# Patient Record
Sex: MALE | Race: WHITE | NOT HISPANIC OR LATINO | Employment: OTHER | URBAN - METROPOLITAN AREA
[De-identification: names, ages, dates, MRNs, and addresses within clinical notes are randomized per-mention and may not be internally consistent; named-entity substitution may affect disease eponyms.]

---

## 2017-03-23 ENCOUNTER — OFFICE VISIT (OUTPATIENT)
Dept: URGENT CARE | Facility: CLINIC | Age: 47
End: 2017-03-23
Payer: MEDICARE

## 2017-03-23 VITALS
DIASTOLIC BLOOD PRESSURE: 100 MMHG | HEART RATE: 90 BPM | WEIGHT: 172 LBS | TEMPERATURE: 98.8 F | BODY MASS INDEX: 27 KG/M2 | HEIGHT: 67 IN | SYSTOLIC BLOOD PRESSURE: 140 MMHG | RESPIRATION RATE: 18 BRPM | OXYGEN SATURATION: 97 %

## 2017-03-23 DIAGNOSIS — R10.84 GENERALIZED ABDOMINAL PAIN: ICD-10-CM

## 2017-03-23 DIAGNOSIS — H92.02 OTALGIA, LEFT EAR: ICD-10-CM

## 2017-03-23 DIAGNOSIS — K12.2 ORAL INFECTION: ICD-10-CM

## 2017-03-23 DIAGNOSIS — R11.2 INTRACTABLE VOMITING WITH NAUSEA, UNSPECIFIED VOMITING TYPE: ICD-10-CM

## 2017-03-23 DIAGNOSIS — R19.5 DARK STOOLS: ICD-10-CM

## 2017-03-23 DIAGNOSIS — K08.89 PAIN, DENTAL: ICD-10-CM

## 2017-03-23 PROCEDURE — G8484 FLU IMMUNIZE NO ADMIN: HCPCS | Performed by: NURSE PRACTITIONER

## 2017-03-23 PROCEDURE — 99214 OFFICE O/P EST MOD 30 MIN: CPT | Performed by: NURSE PRACTITIONER

## 2017-03-23 PROCEDURE — G8432 DEP SCR NOT DOC, RNG: HCPCS | Performed by: NURSE PRACTITIONER

## 2017-03-23 PROCEDURE — G8419 CALC BMI OUT NRM PARAM NOF/U: HCPCS | Performed by: NURSE PRACTITIONER

## 2017-03-23 PROCEDURE — 4004F PT TOBACCO SCREEN RCVD TLK: CPT | Performed by: NURSE PRACTITIONER

## 2017-03-23 RX ORDER — ACETAMINOPHEN 500 MG
500 TABLET ORAL EVERY 6 HOURS PRN
Qty: 15 TAB | Refills: 0 | Status: SHIPPED | OUTPATIENT
Start: 2017-03-23 | End: 2017-03-27

## 2017-03-23 RX ORDER — AMOXICILLIN AND CLAVULANATE POTASSIUM 875; 125 MG/1; MG/1
1 TABLET, FILM COATED ORAL 2 TIMES DAILY
Qty: 14 TAB | Refills: 0 | Status: SHIPPED | OUTPATIENT
Start: 2017-03-23 | End: 2017-03-30

## 2017-03-23 NOTE — MR AVS SNAPSHOT
"        Zev Mckinney   3/23/2017 4:45 PM   Office Visit   MRN: [id number]    Department:  [facility]   Dept Phone:  [phone]    Description:  Male : 1970   Provider:  CLIVE Barlow           Reason for Visit     Oral Swelling x1 month/bottom left of mouth/ left ear pain    GI Problem x 1 month or so/ burning in abdomen/vomiting/feels hot      Allergies as of 3/23/2017     Allergen Noted Reactions    Nkda [No Known Drug Allergy] 2008         You were diagnosed with     Generalized abdominal pain   [695144]       Intractable vomiting with nausea, unspecified vomiting type   [3858340]       Dark stools   [250413]       Otalgia, left ear   [3978385]       Pain, dental   [040482]       Oral infection   [743127]         Vital Signs     Blood Pressure Pulse Temperature Respirations Height Weight    140/100 mmHg 90 37.1 °C (98.8 °F) 18 1.702 m (5' 7\") 78.019 kg (172 lb)    Body Mass Index Oxygen Saturation Smoking Status             26.93 kg/m2 97% Current Every Day Smoker         Basic Information     Date Of Birth Sex Race Ethnicity Preferred Language    1970 Male White Non- English      Problem List              ICD-10-CM Priority Class Noted - Resolved    Cellulitis L03.90 High  8/15/2015 - Present    MRSA (methicillin resistant staph aureus) culture positive Z22.322 High  8/15/2015 - Present    Petechial rash R23.3 Medium  2015 - Present    Tobacco abuse Z72.0 Low  2015 - Present    Diarrhea R19.7 Medium  2015 - Present    Marijuana abuse F12.10 Low  2015 - Present    Methamphetamine abuse F15.10 Low  2015 - Present      Health Maintenance        Date Due Completion Dates    IMM PNEUMOCOCCAL 19-64 (ADULT) MEDIUM RISK SERIES (1 of 1 - PPSV23) 1991 ---    IMM DTaP/Tdap/Td Vaccine (1 - Tdap) 2010, 2010, 2009    IMM INFLUENZA (1) 2016 ---            Current Immunizations     TD Vaccine 2010 12:15 PM, 2010 " 12:15 AM, 11/21/2009 12:45 PM      Below and/or attached are the medications your provider expects you to take. Review all of your home medications and newly ordered medications with your provider and/or pharmacist. Follow medication instructions as directed by your provider and/or pharmacist. Please keep your medication list with you and share with your provider. Update the information when medications are discontinued, doses are changed, or new medications (including over-the-counter products) are added; and carry medication information at all times in the event of emergency situations     Allergies:  NKDA - (reactions not documented)               Medications  Valid as of: March 23, 2017 -  5:32 PM    Generic Name Brand Name Tablet Size Instructions for use    Acetaminophen (Tab) TYLENOL 500 MG Take 1 Tab by mouth every 6 hours as needed for Moderate Pain for up to 4 days.        Amoxicillin-Pot Clavulanate (Tab) AUGMENTIN 875-125 MG Take 1 Tab by mouth 2 times a day for 7 days.        Mupirocin (Ointment) BACTROBAN 2 % Apply 1 Application to affected area(s) 2 times a day.        OXcarbazepine (Tab) TRILEPTAL 300 MG Take 300 mg by mouth 2 times a day.        Sulfamethoxazole-Trimethoprim (Tab) BACTRIM -160 MG Take 1 Tab by mouth 2 times a day.        Topiramate (Tab) TOPAMAX 25 MG Take 25 mg by mouth 2 times a day.        TraZODone HCl (Tab) DESYREL 100 MG Take 100 mg by mouth every evening.        Ziprasidone HCl (Cap) GEODON 60 MG Take 120 mg by mouth every bedtime.        .                 Medicines prescribed today were sent to:     [facility] [address] [place] [address], [place] - [address] [place] [address] AT [facility] & [address] [place] [address]    Phone: [phone] Fax: [phone]    Open 24 Hours?: No      Medication refill instructions:       If your prescription bottle indicates you have medication refills left, it is not necessary to call your provider’s office. Please contact your  pharmacy and they will refill your medication.    If your prescription bottle indicates you do not have any refills left, you may request refills at any time through one of the following ways: The online [employer] system (except Urgent Care), by calling your provider’s office, or by asking your pharmacy to contact your provider’s office with a refill request. Medication refills are processed only during regular business hours and may not be available until the next business day. Your provider may request additional information or to have a follow-up visit with you prior to refilling your medication.   *Please Note: Medication refills are assigned a new Rx number when refilled electronically. Your pharmacy may indicate that no refills were authorized even though a new prescription for the same medication is available at the pharmacy. Please request the medicine by name with the pharmacy before contacting your provider for a refill.        Referral     A referral request has been sent to our patient care coordination department. Please allow 3-5 business days for us to process this request and contact you either by phone or mail. If you do not hear from us by the 5th business day, please call us at [phone].           [facility] Status: Patient Declined        Quit Tobacco Information     Do you want to quit using tobacco?    Quitting tobacco decreases risks of cancer, heart and lung disease, increases life expectancy, improves sense of taste and smell, and increases spending money, among other benefits.    If you are thinking about quitting, we can help.  • [facility] Quit Tobacco Program: 139.414.4324  o Program occurs weekly for four weeks and includes pharmacist consultation on products to support quitting smoking or chewing tobacco. A provider referral is needed for pharmacist consultation.  • Tobacco Users Help Hotline: 4-079-QUIT-NOW (423-3612) or https://[place].quitlogix.org/  o Free, confidential telephone and  online coaching for [place] residents. Sessions are designed on a schedule that is convenient for you. Eligible clients receive free nicotine replacement therapy.  • Nationally: www.smokefree.gov  o Information and professional assistance to support both immediate and long-term needs as you become, and remain, a non-smoker. Smokefree.gov allows you to choose the help that best fits your needs.

## 2017-03-25 ASSESSMENT — ENCOUNTER SYMPTOMS
ROS GI COMMENTS: 1
ABDOMINAL PAIN: 1
HEADACHES: 0
DIARRHEA: 0
DIAPHORESIS: 0
VOMITING: 1
WEAKNESS: 0
SHORTNESS OF BREATH: 0
NAUSEA: 1
CHILLS: 0
CONSTIPATION: 0
COUGH: 0
SORE THROAT: 0
HEARTBURN: 0
FEVER: 0
FLANK PAIN: 0

## 2017-03-25 NOTE — PROGRESS NOTES
"Subjective:      Zev Mckinney is a 46 y.o. male who presents with Oral Swelling and GI Problem            GI Problem  Associated symptoms include abdominal pain, nausea and vomiting. Pertinent negatives include no chest pain, chills, congestion, coughing, diaphoresis, fever, headaches, rash, sore throat or weakness.   Patient comes in today with a 1 month history of oral pain in the lower left molar region with pain radiating to his left ear.  He reports \"bad teeth\" but is not established with a dentist due to cost.  He denies any fever or chills.  He has not taken any medication to treat the symptoms.    As a second concern, he has a month long history of generalized abdominal pain with daily nausea and intermittent vomiting.  He vomits usually once a day.  He also notes some occasional dark stools.  He denies any heartburn.  He denies any constipation or diarrhea.  He has not tried any home measures to treat the symptoms.  He is established with [facility].      Review of Systems   Constitutional: Negative for fever, chills, malaise/fatigue and diaphoresis.   HENT: Positive for ear pain. Negative for congestion and sore throat.    Respiratory: Negative for cough and shortness of breath.    Cardiovascular: Negative for chest pain.   Gastrointestinal: Positive for nausea, vomiting and abdominal pain. Negative for heartburn, diarrhea and constipation.        1   Genitourinary: Negative for dysuria, urgency, frequency, hematuria and flank pain.   Skin: Negative for rash.   Neurological: Negative for weakness and headaches.     Medications, Allergies, and current problem list reviewed today in Epic     Objective:     /100 mmHg  Pulse 90  Temp(Src) 37.1 °C (98.8 °F)  Resp 18  Ht 1.702 m (5' 7\")  Wt 78.019 kg (172 lb)  BMI 26.93 kg/m2  SpO2 97%     Physical Exam   Constitutional: He is oriented to person, place, and time. He appears well-developed and well-nourished. No distress.   HENT:   Head: " Normocephalic.   Right Ear: External ear normal.   Left Ear: External ear normal.   Nose: Nose normal.   Mouth/Throat: No oropharyngeal exudate.   General poor dentition with multiple broken and carious teeth.  No gum swelling, erythema, or fluctuance.  Generalized pain on palpation of the left lower molar region and surrounding gums.  NO purulence or bleeding noted.  Loose molars noted.     Eyes: Conjunctivae are normal. Pupils are equal, round, and reactive to light. Right eye exhibits no discharge. Left eye exhibits no discharge. No scleral icterus.   Neck: Neck supple. No JVD present. No tracheal deviation present. No thyromegaly present.   Cardiovascular: Normal rate, regular rhythm and normal heart sounds.  Exam reveals no gallop and no friction rub.    No murmur heard.  Pulmonary/Chest: Effort normal and breath sounds normal. No stridor. No respiratory distress. He has no wheezes. He has no rales. He exhibits no tenderness.   Abdominal: Soft. Normal appearance and bowel sounds are normal. He exhibits no shifting dullness, no distension, no pulsatile liver, no fluid wave, no ascites, no pulsatile midline mass and no mass. There is no hepatosplenomegaly. There is generalized tenderness. There is no rigidity, no rebound, no guarding, no CVA tenderness, no tenderness at McBurney's point and negative See's sign. No hernia.   Genitourinary: Rectum normal. Rectal exam shows no external hemorrhoid, no internal hemorrhoid, no fissure, no mass, no tenderness and anal tone normal. Guaiac negative stool. Prostate is not enlarged and not tender.   Musculoskeletal: He exhibits no edema.   Lymphadenopathy:     He has no cervical adenopathy.   Neurological: He is alert and oriented to person, place, and time.   Skin: Skin is warm and dry. He is not diaphoretic. No erythema. No pallor.   Vitals reviewed.              Assessment/Plan:     1. Generalized abdominal pain  REFERRAL TO GASTROENTEROLOGY   2. Intractable vomiting  with nausea, unspecified vomiting type  REFERRAL TO GASTROENTEROLOGY   3. Dark stools  REFERRAL TO GASTROENTEROLOGY   4. Otalgia, left ear     5. Pain, dental  acetaminophen (TYLENOL) 500 MG Tab   6. Oral infection  amoxicillin-clavulanate (AUGMENTIN) 875-125 MG Tab     Discussed exam findings with patient.  Take full course of antibiotics for presumed oral infection.  Tylenol as prescribed prn mouth pain.   Follow up with dentist as soon as possible for further assessment and care.   Follow up with GI as referred.  Symptom etiology unknown.  ED precautions given for any worsening symptoms.    Maintain adequate po hydration.  Patient verbalized understanding of and agreed with plan of care.

## 2017-05-27 ENCOUNTER — HOSPITAL ENCOUNTER (EMERGENCY)
Facility: MEDICAL CENTER | Age: 47
End: 2017-05-27
Attending: EMERGENCY MEDICINE
Payer: MEDICARE

## 2017-05-27 VITALS
TEMPERATURE: 98.3 F | WEIGHT: 174 LBS | HEIGHT: 68 IN | BODY MASS INDEX: 26.37 KG/M2 | RESPIRATION RATE: 16 BRPM | OXYGEN SATURATION: 98 % | DIASTOLIC BLOOD PRESSURE: 93 MMHG | SYSTOLIC BLOOD PRESSURE: 132 MMHG | HEART RATE: 71 BPM

## 2017-05-27 DIAGNOSIS — N48.33 PRIAPISM, DRUG-INDUCED: ICD-10-CM

## 2017-05-27 PROCEDURE — 54220 IRRG CRPRA CAVRNOSA PRIAPISM: CPT

## 2017-05-27 PROCEDURE — 700101 HCHG RX REV CODE 250: Performed by: EMERGENCY MEDICINE

## 2017-05-27 PROCEDURE — 700111 HCHG RX REV CODE 636 W/ 250 OVERRIDE (IP): Performed by: EMERGENCY MEDICINE

## 2017-05-27 PROCEDURE — 700105 HCHG RX REV CODE 258: Performed by: EMERGENCY MEDICINE

## 2017-05-27 PROCEDURE — 306637 HCHG MISC ORTHO ITEM RC 0274

## 2017-05-27 PROCEDURE — 99284 EMERGENCY DEPT VISIT MOD MDM: CPT

## 2017-05-27 RX ORDER — LORAZEPAM 0.5 MG/1
0.5 TABLET ORAL EVERY 4 HOURS PRN
Qty: 30 TAB | Refills: 0 | Status: SHIPPED | OUTPATIENT
Start: 2017-05-27 | End: 2023-07-11

## 2017-05-27 RX ADMIN — PHENYLEPHRINE HYDROCHLORIDE: 10 INJECTION INTRAVENOUS at 19:25

## 2017-05-27 RX ADMIN — LIDOCAINE HYDROCHLORIDE 8 ML: 20 INJECTION, SOLUTION INFILTRATION; PERINEURAL at 19:25

## 2017-05-27 ASSESSMENT — LIFESTYLE VARIABLES: DO YOU DRINK ALCOHOL: NO

## 2017-05-27 ASSESSMENT — PAIN SCALES - GENERAL: PAINLEVEL_OUTOF10: 8

## 2017-05-27 NOTE — ED AVS SNAPSHOT
Home Care Instructions                                                                                                                Zev Mckinney   MRN: [id number]    Department:  [facility], Emergency Dept   Date of Visit:  5/27/2017            [facility] Emergency [facility]    [address]    [place] [address]    Phone:  [phone]      You were seen by     Zev Whitman M.D.      Your Diagnosis Was     Priapism, drug-induced     N48.33       These are the medications you received during your hospitalization from 05/27/2017 1843 to 05/27/2017 1954     Date/Time Order Dose Route Action    05/27/2017 1925 phenylephrine (WOODY-SYNEPHRINE) 10 mg/20 mL inj syringe   Intracavernosal Bolus    05/27/2017 1925 lidocaine buffered 1.8% injection 8 mL 8 mL Injection Given      Follow-up Information     1. Follow up with Luis White M.D..    Specialty:  Urology    Why:  As needed, If symptoms worsen    Contact information    [address]  [facility] [id number] [phone]        Medication Information     Review all of your home medications and newly ordered medications with your primary doctor and/or pharmacist as soon as possible. Follow medication instructions as directed by your doctor and/or pharmacist.     Please keep your complete medication list with you and share with your physician. Update the information when medications are discontinued, doses are changed, or new medications (including over-the-counter products) are added; and carry medication information at all times in the event of emergency situations.               Medication List      ASK your doctor about these medications        Instructions    Morning Afternoon Evening Bedtime    mupirocin 2 % Oint   Commonly known as:  BACTROBAN        Apply 1 Application to affected area(s) 2 times a day.   Dose:  1 Application                        oxcarbazepine 300 MG Tabs   Commonly known as:  TRILEPTAL       Take 300 mg by mouth 2 times a day.   Dose:  300 mg                        sulfamethoxazole-trimethoprim 800-160 MG tablet   Commonly known as:  BACTRIM DS        Take 1 Tab by mouth 2 times a day.   Dose:  1 Tab                        topiramate 25 MG Tabs   Commonly known as:  TOPAMAX        Take 25 mg by mouth 2 times a day.   Dose:  25 mg                        trazodone 100 MG Tabs   Commonly known as:  DESYREL        Take 100 mg by mouth every evening.   Dose:  100 mg                        ziprasidone 60 MG Caps   Commonly known as:  GEODON        Take 120 mg by mouth every bedtime.   Dose:  120 mg                                  Discharge Instructions       Priapism  Stop the drug trazodone this gave you this condition  Priapism is a persistent, often painful erection. It is the hardening of the penis in males and of the clitoris in females, even without sexual stimulation. Priapism may come on suddenly. Priapism may last a short while, or may last a long time. Priapism occurs in all ages. The types of priapism include:  · Acute prolonged priapism--Priapism that comes on suddenly and lasts.  · Recurrent acute priapism--Priapism that comes on suddenly and tends to happen again.  · Chronic priapism--Priapism that is persistent but with less of an erection.  CAUSES   There are many causes. Causes include:  · Blood problems common in people with the following diseases:  ¨ Sickle cell disease.  ¨ Leukemia.  · Side effects of erectile dysfunction medicine. This is the most common cause of priapism.  · Side effects of prescription medicine used in the treatment of depression and anxiety.  · Illegal use of street drugs such as cocaine and marijuana.  · Excessive use of alcohol.  · Neurological problems such as multiple sclerosis.  · Diabetes mellitus.  · The cause may be unknown.  SIGNS AND SYMPTOMS  · A prolonged erection, usually without sexual stimulation or following the use of erectile dysfunction  medicine.  · A painful erection.  DIAGNOSIS  Diagnosis of priapism can usually be confirmed by your health care provider after a physical exam. Your health care provider may have blood tests done to search for a potential cause, such as leukemia or sickle cell disease.  TREATMENT   Treatments depend on the cause. Some specific treatments include:  · Oxygen and red blood cell transfusions in patients with sickle cell disease.  · A special treatment for plasma in those with leukemia.  · Removing blood that is trapped.  · Treatment with medicine.  · Surgical shunting (a passage that is made to allow blood to flow from one part of the body to another).  HOME CARE INFORMATION  · Avoid sexual stimulation and intercourse until your health care provider says it is okay.  · Avoid the use of alcohol or drugs to minimize recurrence of priapism.  SEEK MEDICAL CARE IF:  · You experience worsening pain instead of improvement.  SEEK IMMEDIATE MEDICAL CARE IF:  · You experience fever or shaking chills.  · You experience pain, swelling, or redness in your genital or groin area.  MAKE SURE YOU:  · Understand these instructions.    · Will watch your condition.  · Will get help right away if you are not doing well or get worse.     This information is not intended to replace advice given to you by your health care provider. Make sure you discuss any questions you have with your health care provider.     Document Released: 03/09/2005 Document Revised: 10/08/2014 Document Reviewed: 03/22/2016  Elsevier Interactive Patient Education ©2016 Elsevier Inc.            Patient Information     Patient Information    Following emergency treatment: all patient requiring follow-up care must return either to a private physician or a clinic if your condition worsens before you are able to obtain further medical attention, please return to the emergency room.     Billing Information    At [facility], we work to make the billing process streamlined  for our patients.  Our Representatives are here to answer any questions you may have regarding your hospital bill.  If you have insurance coverage and have supplied your insurance information to us, we will submit a claim to your insurer on your behalf.  Should you have any questions regarding your bill, we can be reached online or by phone as follows:  Online: You are able pay your bills online or live chat with our representatives about any billing questions you may have. We are here to help Monday - Friday from 8:00am to 7:30pm and 9:00am - 12:00pm on Saturdays.  Please visit [email or web address]  for more information.   Phone:  [phone] or [phone]    Please note that your emergency physician, surgeon, pathologist, radiologist, anesthesiologist, and other specialists are not employed by [facility] and will therefore bill separately for their services.  Please contact them directly for any questions concerning their bills at the numbers below:     Emergency Physician Services:  [phone]  [facility]:  [phone]  [facility]:  [phone]  [facility]:  [phone]    1. Your final bill may vary from the amount quoted upon discharge if all procedures are not complete at that time, or if your doctor has additional procedures of which we are not aware. You will receive an additional bill if you return to the Emergency Department at [facility] for suture removal regardless of the facility of which the sutures were placed.     2. Please arrange for settlement of this account at the emergency registration.    3. All self-pay accounts are due in full at the time of treatment.  If you are unable to meet this obligation then payment is expected within 4-5 days.     4. If you have had radiology studies (CT, X-ray, Ultrasound, MRI), you have received a preliminary result during your emergency department visit. Please contact  the radiology department [phone] to receive a copy of your final result. Please discuss the Final result with your primary physician or with the follow up physician provided.     Crisis Hotline:  [place] Crisis Hotline:  0-712-NJYXRZU or 1-788.632.5998  [place] Crisis Hotline:    1-613.536.3344 or 717-072-4385         ED Discharge Follow Up Questions    1. In order to provide you with very good care, we would like to follow up with a phone call in the next few days.  May we have your permission to contact you?     YES /  NO    2. What is the best phone number to call you? (       )_____-__________    3. What is the best time to call you?      Morning  /  Afternoon  /  Evening                   Patient Signature:  ____________________________________________________________    Date:  ____________________________________________________________

## 2017-05-27 NOTE — ED AVS SNAPSHOT
[employer] Access Code: Activation code not generated  Current [employer] Status: Patient Declined    Your email address is not on file at [facility].  Email Addresses are required for you to sign up for [employer], please contact [phone] to verify your personal information and to provide your email address prior to attempting to register for [employer].    Zev Mckinney  [address]  [place] [address]    [employer]  A secure, online tool to manage your health information     [facility]’s [employer]® is a secure, online tool that connects you to your personalized health information from the privacy of your home -- day or night - making it very easy for you to manage your healthcare. Once the activation process is completed, you can even access your medical information using the [employer] thania, which is available for free in the Apple Thania store or Google Play store.     To learn more about [employer], visit [email or web address]    There are two levels of access available (as shown below):   My Chart Features  [facility] Primary Care Doctor [facility]  Specialists [facility]  Urgent  Care Non-[facility] Primary Care Doctor   Email your healthcare team securely and privately 24/7 X X X    Manage appointments: schedule your next appointment; view details of past/upcoming appointments X      Request prescription refills. X      View recent personal medical records, including lab and immunizations X X X X   View health record, including health history, allergies, medications X X X X   Read reports about your outpatient visits, procedures, consult and ER notes X X X X   See your discharge summary, which is a recap of your hospital and/or ER visit that includes your diagnosis, lab results, and care plan X X  X     How to register for [employer]:  Once your e-mail address has been verified, follow the following steps to sign up for [employer].     1. Go to  [email or web address]  2. Click on the Sign Up Now box, which takes you to the New  Member Sign Up page. You will need to provide the following information:  a. Enter your [employer] Access Code exactly as it appears at the top of this page. (You will not need to use this code after you’ve completed the sign-up process. If you do not sign up before the expiration date, you must request a new code.)   b. Enter your date of birth.   c. Enter your home email address.   d. Click Submit, and follow the next screen’s instructions.  3. Create a Digital [employer] ID. This will be your [employer] login ID and cannot be changed, so think of one that is secure and easy to remember.  4. Create a [employer] password. You can change your password at any time.  5. Enter your Password Reset Question and Answer. This can be used at a later time if you forget your password.   6. Enter your e-mail address. This allows you to receive e-mail notifications when new information is available in [employer].  7. Click Sign Up. You can now view your health information.    For assistance activating your [employer] account, call [phone]

## 2017-05-27 NOTE — ED AVS SNAPSHOT
5/27/2017    Zev Mckinney  [address]  [place] [address]    Dear Zev:    [facility] wants to ensure your discharge home is safe and you or your loved ones have had all of your questions answered regarding your care after you leave the hospital.    Below is a list of resources and contact information should you have any questions regarding your hospital stay, follow-up instructions, or active medical symptoms.    Questions or Concerns Regarding… Contact   Medical Questions Related to Your Discharge  (7 days a week, 8am-5pm) Contact a Nurse Care Coordinator   [phone]   Medical Questions Not Related to Your Discharge  (24 hours a day / 7 days a week)  Contact the Nurse Health Line   [phone]    Medications or Discharge Instructions Refer to your discharge packet   or contact your [facility] Primary Care Provider   [phone]   Follow-up Appointment(s) Schedule your appointment via [employer]   or contact Scheduling [phone]   Billing Review your statement via [employer]  or contact Billing [phone]   Medical Records Review your records via [employer]   or contact Medical Records [phone]     You may receive a telephone call within two days of discharge. This call is to make certain you understand your discharge instructions and have the opportunity to have any questions answered. You can also easily access your medical information, test results and upcoming appointments via the [employer] free online health management tool. You can learn more and sign up at [employer]. For assistance setting up your [employer] account, please call [phone].    Once again, we want to ensure your discharge home is safe and that you have a clear understanding of any next steps in your care. If you have any questions or concerns, please do not hesitate to contact us, we are here for you. Thank you for choosing [facility] for your healthcare needs.    Sincerely,    Your [facility] Healthcare Team

## 2017-05-28 NOTE — ED NOTES
Chief Complaint   Patient presents with   • Groin Pain     Pt BIB EMS from [facility] for c/o Priapism since 0700. pt denies any trauma.      ERP at bedside.

## 2017-05-28 NOTE — ED PROVIDER NOTES
ED Provider Note    CHIEF COMPLAINT  Chief Complaint   Patient presents with   • Groin Pain     Pt BIB EMS from [facility] for c/o Priapism since 0700. pt denies any trauma.        HPI  Zev Mckinney is a 46 y.o. male who presents for evaluation of persistent non-erotic erection since 7 AM. The patient has extensive psychiatric history is on several medications including trazodone. He was diagnosed with priapism at an outside facility. They attempted subcutaneous terbutaline and the shoulder with no improvement. They contacted Dr. Amanda here with urology and recommended transfer here as they did not have phenylephrine or any experience in managing this. He arrives here with a persistent erection for 12 hours. He has no history of sickle cell disease. This never happened before.    REVIEW OF SYSTEMS  See HPI for further details. No numbness tingling weakness fevers or chills All other systems are negative.     PAST MEDICAL HISTORY  Past Medical History   Diagnosis Date   • Deafness      wears 2 hearing aids   • ASTHMA    • Psychiatric disorder    • Bipolar 2 disorder (CMS-[facility])    • ADD (attention deficit disorder)    • Hypertension        FAMILY HISTORY  No history of sickle cell disease    SOCIAL HISTORY  Social History     Social History   • Marital Status:      Spouse Name: N/A   • Number of Children: N/A   • Years of Education: N/A     Social History Main Topics   • Smoking status: Current Every Day Smoker -- 2.00 packs/day     Types: Cigarettes   • Smokeless tobacco: Not on file      Comment: 5 cigs per day   • Alcohol Use: Yes   • Drug Use: Yes      Comment: meth   • Sexual Activity: Not on file     Other Topics Concern   • Not on file     Social History Narrative     Denies IV drugs  SURGICAL HISTORY  Past Surgical History   Procedure Laterality Date   • Other abdominal surgery       gastric ulcer       CURRENT MEDICATIONS  No current facility-administered medications for this  "encounter.    Current outpatient prescriptions:   •  trazodone (DESYREL) 100 MG Tab, Take 100 mg by mouth every evening., Disp: , Rfl:   •  ziprasidone (GEODON) 60 MG Cap, Take 120 mg by mouth every bedtime., Disp: , Rfl:   •  sulfamethoxazole-trimethoprim (BACTRIM DS) 800-160 MG tablet, Take 1 Tab by mouth 2 times a day., Disp: 20 Tab, Rfl: 0  •  mupirocin (BACTROBAN) 2 % Ointment, Apply 1 Application to affected area(s) 2 times a day., Disp: 1 Tube, Rfl: 2  •  oxcarbazepine (TRILEPTAL) 300 MG Tab, Take 300 mg by mouth 2 times a day., Disp: , Rfl:   •  topiramate (TOPAMAX) 25 MG Tab, Take 25 mg by mouth 2 times a day., Disp: , Rfl:       ALLERGIES  Allergies   Allergen Reactions   • Nkda [No Known Drug Allergy]        PHYSICAL EXAM  VITAL SIGNS: /93 mmHg  Pulse 75  Temp(Src) 36.8 °C (98.3 °F)  Resp 16  Ht 1.727 m (5' 7.99\")  Wt 78.926 kg (174 lb)  BMI 26.46 kg/m2  SpO2 98% Room air O2: 98    Constitutional: Well developed, Well nourished, No acute distress, Non-toxic appearance.   HENT: Normocephalic, Atraumatic, Bilateral external ears normal, Oropharynx moist, No oral exudates, Nose normal.   Eyes: PERRLA, EOMI, Conjunctiva normal, No discharge.   Neck: Normal range of motion, No tenderness, Supple, No stridor.   Cardiovascular: Normal heart rate, Normal rhythm, No murmurs, No rubs, No gallops.   Thorax & Lungs: Normal breath sounds, No respiratory distress, No wheezing, No chest tenderness.   Abdomen: Bowel sounds normal, Soft, No tenderness, No masses, No pulsatile masses.   Skin: Warm, Dry, No erythema, No rash.   Genitalia: Agent has a correction. Distal capillary refill is normal [place] is normal  Extremities: Intact distal pulses, No edema, No tenderness, No cyanosis, No clubbing.   Musculoskeletal: Good range of motion in all major joints. No tenderness to palpation or major deformities noted.   Neurologic: Alert & oriented x 3, Normal motor function, Normal sensory function, No focal deficits " noted.   Psychiatric: Anxious       RADIOLOGY/PROCEDURES  Physician procedure: Dorsal penile nerve block, corpus cavernosum aspiration and irrigation with phenylephrine injections. Verbal consent was obtained. A total of 10 mL of lidocaine without epinephrine was injected in a field block around entire penis. No additional 1 mL was placed on the dorsal aspect about 1 inch distal to the base. A 20-gauge needle was inserted at 45° on the dorsal aspect on each side and 10 mL of dark viscous blood was aspirated. A total of 6 injections, 3 on each side of 1 mL of 500 µg per mL phenylephrine was injected every 5 minutes until detumescence was achieved. The patient was observed for an additional 45 minutes and had no recurrence    COURSE & MEDICAL DECISION MAKING  Pertinent Labs & Imaging studies reviewed. (See chart for details)  I reviewed the case with Dr. Amanda with urology. I have had experienced managing this and advised him that I will attempt initial aspiration followed by sequential phenylephrine injections until there is detumescence. This was successfully performed. The patient tolerated the procedure quite well. I will advise him to discontinue his trazodone as this is most likely the etiology     FINAL IMPRESSION  1. Priapism      Electronically signed by: Zev Whitman, 5/27/2017 7:38 PM

## 2017-05-28 NOTE — DISCHARGE INSTRUCTIONS
Priapism  Stop the drug trazodone this gave you this condition  Priapism is a persistent, often painful erection. It is the hardening of the penis in males and of the clitoris in females, even without sexual stimulation. Priapism may come on suddenly. Priapism may last a short while, or may last a long time. Priapism occurs in all ages. The types of priapism include:  · Acute prolonged priapism--Priapism that comes on suddenly and lasts.  · Recurrent acute priapism--Priapism that comes on suddenly and tends to happen again.  · Chronic priapism--Priapism that is persistent but with less of an erection.  CAUSES   There are many causes. Causes include:  · Blood problems common in people with the following diseases:  ¨ Sickle cell disease.  ¨ Leukemia.  · Side effects of erectile dysfunction medicine. This is the most common cause of priapism.  · Side effects of prescription medicine used in the treatment of depression and anxiety.  · Illegal use of street drugs such as cocaine and marijuana.  · Excessive use of alcohol.  · Neurological problems such as multiple sclerosis.  · Diabetes mellitus.  · The cause may be unknown.  SIGNS AND SYMPTOMS  · A prolonged erection, usually without sexual stimulation or following the use of erectile dysfunction medicine.  · A painful erection.  DIAGNOSIS  Diagnosis of priapism can usually be confirmed by your health care provider after a physical exam. Your health care provider may have blood tests done to search for a potential cause, such as leukemia or sickle cell disease.  TREATMENT   Treatments depend on the cause. Some specific treatments include:  · Oxygen and red blood cell transfusions in patients with sickle cell disease.  · A special treatment for plasma in those with leukemia.  · Removing blood that is trapped.  · Treatment with medicine.  · Surgical shunting (a passage that is made to allow blood to flow from one part of the body to another).  HOME CARE  INFORMATION  · Avoid sexual stimulation and intercourse until your health care provider says it is okay.  · Avoid the use of alcohol or drugs to minimize recurrence of priapism.  SEEK MEDICAL CARE IF:  · You experience worsening pain instead of improvement.  SEEK IMMEDIATE MEDICAL CARE IF:  · You experience fever or shaking chills.  · You experience pain, swelling, or redness in your genital or groin area.  MAKE SURE YOU:  · Understand these instructions.    · Will watch your condition.  · Will get help right away if you are not doing well or get worse.     This information is not intended to replace advice given to you by your health care provider. Make sure you discuss any questions you have with your health care provider.     Document Released: 03/09/2005 Document Revised: 10/08/2014 Document Reviewed: 03/22/2016  Elsevier Interactive Patient Education ©2016 [employer]

## 2017-05-28 NOTE — ED NOTES
Patient appropriate for discharge per ERP. Discussed discharge instructions, home care, and follow up with patient. Patient verbalized understanding. No distress noted. Pt instructed to stop taking trazadone, pt verbalized understanding. Pt ambulatory to the [facility].

## 2017-10-09 ENCOUNTER — APPOINTMENT (OUTPATIENT)
Dept: RADIOLOGY | Facility: MEDICAL CENTER | Age: 47
End: 2017-10-09
Attending: EMERGENCY MEDICINE
Payer: MEDICARE

## 2017-10-09 ENCOUNTER — HOSPITAL ENCOUNTER (EMERGENCY)
Facility: MEDICAL CENTER | Age: 47
End: 2017-10-09
Attending: EMERGENCY MEDICINE
Payer: MEDICARE

## 2017-10-09 VITALS
HEIGHT: 65 IN | OXYGEN SATURATION: 98 % | SYSTOLIC BLOOD PRESSURE: 171 MMHG | WEIGHT: 148.59 LBS | DIASTOLIC BLOOD PRESSURE: 100 MMHG | RESPIRATION RATE: 16 BRPM | BODY MASS INDEX: 24.76 KG/M2 | TEMPERATURE: 98.3 F | HEART RATE: 76 BPM

## 2017-10-09 DIAGNOSIS — K02.9 INFECTED DENTAL CARRIES: ICD-10-CM

## 2017-10-09 DIAGNOSIS — L03.211 FACIAL CELLULITIS: ICD-10-CM

## 2017-10-09 DIAGNOSIS — K04.7 INFECTED DENTAL CARRIES: ICD-10-CM

## 2017-10-09 LAB
ANION GAP SERPL CALC-SCNC: 10 MMOL/L (ref 0–11.9)
BASOPHILS # BLD AUTO: 0.6 % (ref 0–1.8)
BASOPHILS # BLD: 0.1 K/UL (ref 0–0.12)
BUN SERPL-MCNC: 7 MG/DL (ref 8–22)
CALCIUM SERPL-MCNC: 10 MG/DL (ref 8.5–10.5)
CHLORIDE SERPL-SCNC: 101 MMOL/L (ref 96–112)
CO2 SERPL-SCNC: 25 MMOL/L (ref 20–33)
CREAT SERPL-MCNC: 0.8 MG/DL (ref 0.5–1.4)
EOSINOPHIL # BLD AUTO: 0.08 K/UL (ref 0–0.51)
EOSINOPHIL NFR BLD: 0.5 % (ref 0–6.9)
ERYTHROCYTE [DISTWIDTH] IN BLOOD BY AUTOMATED COUNT: 41.6 FL (ref 35.9–50)
GFR SERPL CREATININE-BSD FRML MDRD: >60 ML/MIN/1.73 M 2
GLUCOSE SERPL-MCNC: 131 MG/DL (ref 65–99)
HCT VFR BLD AUTO: 44 % (ref 42–52)
HGB BLD-MCNC: 15.3 G/DL (ref 14–18)
IMM GRANULOCYTES # BLD AUTO: 0.07 K/UL (ref 0–0.11)
IMM GRANULOCYTES NFR BLD AUTO: 0.4 % (ref 0–0.9)
LYMPHOCYTES # BLD AUTO: 1.7 K/UL (ref 1–4.8)
LYMPHOCYTES NFR BLD: 9.9 % (ref 22–41)
MCH RBC QN AUTO: 31 PG (ref 27–33)
MCHC RBC AUTO-ENTMCNC: 34.8 G/DL (ref 33.7–35.3)
MCV RBC AUTO: 89.2 FL (ref 81.4–97.8)
MONOCYTES # BLD AUTO: 1.12 K/UL (ref 0–0.85)
MONOCYTES NFR BLD AUTO: 6.5 % (ref 0–13.4)
NEUTROPHILS # BLD AUTO: 14.18 K/UL (ref 1.82–7.42)
NEUTROPHILS NFR BLD: 82.1 % (ref 44–72)
NRBC # BLD AUTO: 0 K/UL
NRBC BLD AUTO-RTO: 0 /100 WBC
PLATELET # BLD AUTO: 372 K/UL (ref 164–446)
PMV BLD AUTO: 8.6 FL (ref 9–12.9)
POTASSIUM SERPL-SCNC: 4.1 MMOL/L (ref 3.6–5.5)
RBC # BLD AUTO: 4.93 M/UL (ref 4.7–6.1)
SODIUM SERPL-SCNC: 136 MMOL/L (ref 135–145)
WBC # BLD AUTO: 17.3 K/UL (ref 4.8–10.8)

## 2017-10-09 PROCEDURE — 99285 EMERGENCY DEPT VISIT HI MDM: CPT

## 2017-10-09 PROCEDURE — 70487 CT MAXILLOFACIAL W/DYE: CPT

## 2017-10-09 PROCEDURE — 70355 PANORAMIC X-RAY OF JAWS: CPT

## 2017-10-09 PROCEDURE — 96366 THER/PROPH/DIAG IV INF ADDON: CPT

## 2017-10-09 PROCEDURE — 700117 HCHG RX CONTRAST REV CODE 255: Performed by: EMERGENCY MEDICINE

## 2017-10-09 PROCEDURE — 96375 TX/PRO/DX INJ NEW DRUG ADDON: CPT | Mod: XU

## 2017-10-09 PROCEDURE — 700105 HCHG RX REV CODE 258: Performed by: EMERGENCY MEDICINE

## 2017-10-09 PROCEDURE — 80048 BASIC METABOLIC PNL TOTAL CA: CPT

## 2017-10-09 PROCEDURE — 700111 HCHG RX REV CODE 636 W/ 250 OVERRIDE (IP): Performed by: EMERGENCY MEDICINE

## 2017-10-09 PROCEDURE — 96365 THER/PROPH/DIAG IV INF INIT: CPT | Mod: XU

## 2017-10-09 PROCEDURE — 85025 COMPLETE CBC W/AUTO DIFF WBC: CPT

## 2017-10-09 RX ORDER — AMPICILLIN AND SULBACTAM 2; 1 G/1; G/1
3 INJECTION, POWDER, FOR SOLUTION INTRAMUSCULAR; INTRAVENOUS ONCE
Status: DISCONTINUED | OUTPATIENT
Start: 2017-10-09 | End: 2017-10-09

## 2017-10-09 RX ORDER — IBUPROFEN 600 MG/1
600 TABLET ORAL EVERY 6 HOURS PRN
Qty: 30 TAB | Refills: 0 | Status: ON HOLD | OUTPATIENT
Start: 2017-10-09 | End: 2021-02-24

## 2017-10-09 RX ORDER — AMPICILLIN AND SULBACTAM 2; 1 G/1; G/1
3 INJECTION, POWDER, FOR SOLUTION INTRAMUSCULAR; INTRAVENOUS ONCE
Status: COMPLETED | OUTPATIENT
Start: 2017-10-09 | End: 2017-10-09

## 2017-10-09 RX ORDER — KETOROLAC TROMETHAMINE 30 MG/ML
30 INJECTION, SOLUTION INTRAMUSCULAR; INTRAVENOUS ONCE
Status: COMPLETED | OUTPATIENT
Start: 2017-10-09 | End: 2017-10-09

## 2017-10-09 RX ORDER — HYDROCODONE BITARTRATE AND ACETAMINOPHEN 5; 325 MG/1; MG/1
1-2 TABLET ORAL EVERY 6 HOURS PRN
Qty: 12 TAB | Refills: 0 | Status: ON HOLD | OUTPATIENT
Start: 2017-10-09 | End: 2021-02-24

## 2017-10-09 RX ORDER — CLINDAMYCIN HYDROCHLORIDE 150 MG/1
450 CAPSULE ORAL 3 TIMES DAILY
Qty: 90 CAP | Refills: 0 | Status: SHIPPED | OUTPATIENT
Start: 2017-10-09 | End: 2017-10-19

## 2017-10-09 RX ORDER — SODIUM CHLORIDE 9 MG/ML
1000 INJECTION, SOLUTION INTRAVENOUS ONCE
Status: COMPLETED | OUTPATIENT
Start: 2017-10-09 | End: 2017-10-09

## 2017-10-09 RX ADMIN — KETOROLAC TROMETHAMINE 30 MG: 30 INJECTION, SOLUTION INTRAMUSCULAR at 03:34

## 2017-10-09 RX ADMIN — IOHEXOL 80 ML: 350 INJECTION, SOLUTION INTRAVENOUS at 04:10

## 2017-10-09 RX ADMIN — AMPICILLIN SODIUM AND SULBACTAM SODIUM 3 G: 2; 1 INJECTION, POWDER, FOR SOLUTION INTRAMUSCULAR; INTRAVENOUS at 03:34

## 2017-10-09 RX ADMIN — SODIUM CHLORIDE 1000 ML: 9 INJECTION, SOLUTION INTRAVENOUS at 03:36

## 2017-10-09 ASSESSMENT — LIFESTYLE VARIABLES
EVER FELT BAD OR GUILTY ABOUT YOUR DRINKING: NO
TOTAL SCORE: 0
CONSUMPTION TOTAL: INCOMPLETE
HAVE YOU EVER FELT YOU SHOULD CUT DOWN ON YOUR DRINKING: NO
HAVE PEOPLE ANNOYED YOU BY CRITICIZING YOUR DRINKING: NO
EVER HAD A DRINK FIRST THING IN THE MORNING TO STEADY YOUR NERVES TO GET RID OF A HANGOVER: NO
TOTAL SCORE: 0
DO YOU DRINK ALCOHOL: YES
TOTAL SCORE: 0

## 2017-10-09 ASSESSMENT — PAIN SCALES - GENERAL: PAINLEVEL_OUTOF10: 6

## 2017-10-09 ASSESSMENT — ENCOUNTER SYMPTOMS
FEVER: 0
EYE PAIN: 0
EYE DISCHARGE: 0
ABDOMINAL PAIN: 0

## 2017-10-09 NOTE — ED NOTES
Pt. Updated on the POC to be discharged after fluids. Pt. Provided urinal. Call light within reach. Will continue to monitor.

## 2017-10-09 NOTE — ED NOTES
"Chief Complaint   Patient presents with   • Facial Swelling     R side. R upper dental pain. Swelling to R side of face.     BP (!) 171/100   Pulse 96   Temp 36.8 °C (98.3 °F)   Resp 16   Ht 1.651 m (5' 5\")   Wt 67.4 kg (148 lb 9.4 oz)   SpO2 99%   BMI 24.73 kg/m²     Pt ambulated into triage, complaints as above. Pt restless, unable to sit still during triage process. Pt reports pain and swelling \"shooting\" to his R eye. VS as above, NAD, encouraged to return to the triage nurse or tech with any new complaints or symptoms.  "

## 2017-10-09 NOTE — ED PROVIDER NOTES
ED Provider Note    Scribed for Herb Sandoval M.D. by Gurjit Norwood. 10/9/2017, 3:10 AM.    Primary care provider: Pcp Pt States None  Means of arrival: Walk-In  History obtained from: Patient  History limited by: None    CHIEF COMPLAINT  Chief Complaint   Patient presents with   • Facial Swelling     R side. R upper dental pain. Swelling to R side of face.     HPI  Zev Mckinney is a 47 y.o. male who presents to the Emergency Department complaining of progressively worsening right sided facial swelling onset a few days ago. The patient split his right upper tooth awhile ago and has seen a dentist but ended up leaving. Now he has significant pain to his right upper teeth with radiation towards right eye. Denies abdominal pain, fever. Denies allergies to medications.    REVIEW OF SYSTEMS  Review of Systems   Constitutional: Negative for fever.   HENT:        + Facial swelling  + Right tooth pain   Eyes: Negative for pain and discharge.   Gastrointestinal: Negative for abdominal pain.   All other systems reviewed and are negative.    C.    PAST MEDICAL HISTORY   has a past medical history of ADD (attention deficit disorder); ASTHMA; Bipolar 2 disorder (CMS-HCC); Deafness; Hypertension; and Psychiatric disorder.    SURGICAL HISTORY   has a past surgical history that includes other abdominal surgery.    SOCIAL HISTORY  Social History   Substance Use Topics   • Smoking status: Current Every Day Smoker     Packs/day: 2.00     Types: Cigarettes   • Smokeless tobacco: Current User      Comment: 5 cigs per day   • Alcohol use Yes      History   Drug Use     Comment: meth     FAMILY HISTORY  History reviewed. No pertinent family history.    CURRENT MEDICATIONS  Home Medications     Reviewed by Kayla Youngblood R.N. (Registered Nurse) on 10/09/17 at 0246  Med List Status: Partial   Medication Last Dose Status   lorazepam (ATIVAN) 0.5 MG Tab  Active   mupirocin (BACTROBAN) 2 % Ointment  Active   oxcarbazepine  "(TRILEPTAL) 300 MG Tab  Active   sulfamethoxazole-trimethoprim (BACTRIM DS) 800-160 MG tablet  Active   topiramate (TOPAMAX) 25 MG Tab  Active   trazodone (DESYREL) 100 MG Tab  Active   ziprasidone (GEODON) 60 MG Cap  Active              ALLERGIES  Allergies   Allergen Reactions   • [place]      PHYSICAL EXAM  VITAL SIGNS: BP (!) 171/100   Pulse 96   Temp 36.8 °C (98.3 °F)   Resp 16   Ht 1.651 m (5' 5\")   Wt 67.4 kg (148 lb 9.4 oz)   SpO2 99%   BMI 24.73 kg/m²     Constitutional: Well nourished, Moderate distress.   HENT: Normocephalic, Moderate swelling over the right cheek with significant tenderness, Slight fluctuance to the upper cheek, Mild erythema to lower eyelid, Severe dental caries throughout mouth, Right 1st premolar is fractured in half with no obvious pointing abscess to drain, Posterior oropharynx is normal.   Eyes: Conjunctiva normal, No discharge. Extraocular motion intact without pain  Lymph: No lymphadenopathy.   Cardiovascular: Normal heart rate, Normal rhythm, No murmurs, equal pulses.   Pulmonary: Normal breath sounds, No respiratory distress, No wheezing, No rales, No rhonchi.  Skin: Warm, Dry.Slight erythema over the right cheek  Neurologic: Alert & oriented x 3, Normal motor function,  No focal deficits noted.   Psychiatric: Affect normal, Judgment normal, Mood normal.     LABS  Results for orders placed or performed during the hospital encounter of 10/09/17   CBC WITH DIFFERENTIAL   Result Value Ref Range    WBC 17.3 (H) 4.8 - 10.8 K/uL    RBC 4.93 4.70 - 6.10 M/uL    Hemoglobin 15.3 14.0 - 18.0 g/dL    Hematocrit 44.0 42.0 - 52.0 %    MCV 89.2 81.4 - 97.8 fL    MCH 31.0 27.0 - 33.0 pg    MCHC 34.8 33.7 - 35.3 g/dL    RDW 41.6 35.9 - 50.0 fL    Platelet Count 372 164 - 446 K/uL    MPV 8.6 (L) 9.0 - 12.9 fL    Neutrophils-Polys 82.10 (H) 44.00 - 72.00 %    Lymphocytes 9.90 (L) 22.00 - 41.00 %    Monocytes 6.50 0.00 - 13.40 %    Eosinophils 0.50 0.00 - 6.90 %    Basophils 0.60 0.00 - " 1.80 %    Immature Granulocytes 0.40 0.00 - 0.90 %    Nucleated RBC 0.00 /100 WBC    Neutrophils (Absolute) 14.18 (H) 1.82 - 7.42 K/uL    Lymphs (Absolute) 1.70 1.00 - 4.80 K/uL    Monos (Absolute) 1.12 (H) 0.00 - 0.85 K/uL    Eos (Absolute) 0.08 0.00 - 0.51 K/uL    Baso (Absolute) 0.10 0.00 - 0.12 K/uL    Immature Granulocytes (abs) 0.07 0.00 - 0.11 K/uL    NRBC (Absolute) 0.00 K/uL   BASIC METABOLIC PANEL   Result Value Ref Range    Sodium 136 135 - 145 mmol/L    Potassium 4.1 3.6 - 5.5 mmol/L    Chloride 101 96 - 112 mmol/L    Co2 25 20 - 33 mmol/L    Glucose 131 (H) 65 - 99 mg/dL    Bun 7 (L) 8 - 22 mg/dL    Creatinine 0.80 0.50 - 1.40 mg/dL    Calcium 10.0 8.5 - 10.5 mg/dL    Anion Gap 10.0 0.0 - 11.9   ESTIMATED GFR   Result Value Ref Range    GFR If African American >60 >60 mL/min/1.73 m 2    GFR If Non African American >60 >60 mL/min/1.73 m 2     All labs reviewed by me.    RADIOLOGY  CT-MAXILLOFACIAL WITH PLUS RECONS   Final Result      Right facial cellulitis without abscess      [id number]   Final Result      No periapical abscess identified        The radiologist's interpretation of all radiological studies have been reviewed by me.    COURSE & MEDICAL DECISION MAKING  Pertinent Labs & Imaging studies reviewed. (See chart for details)    3:10 AM - Patient seen and examined at bedside. Patient will be treated with Unasyn, 30mg Toradol, IV fluids for hydration. Ordered DX-Mandible, CT-Maxillofacial, Estimated GFR, CBC, BMP to evaluate his symptoms. The differential diagnoses include but are not limited to: Facial Abscess, Dental Caries    4:35 AM - Patient seen at bedside. I discussed that he does not have an abscess but he has an infection. I strongly advised him to follow-up with a dentist to have his tooth pulled. The patient was given return precautions and he understands and verbalizes agreement.     Medical Decision Making:Patient presented with swelling and redness to the right side of  his face with a dental fracture and dental caries. I was concerned about a possible facial abscess secondary to infected dental caries. Therefore CT of the face was done. This just demonstrates cellulitis of the face. Patient has already been given in a dose of Unasyn. This point time patient does not appear to be septic. There is no obvious abscess to be drained. We'll give the patient a trial of oral antibiotics. I've referred him to dental care to have his tooth extracted or fixed. There is no signs of Roger angina. Or orbital cellulitis    I reviewed prescription monitoring program for patient's narcotic use before prescribing a scheduled drug.The patient will not drink alcohol nor drive with prescribed medications. The patient will return for new or worsening symptoms and is stable at the time of discharge.    DISPOSITION:  Patient will be discharged home in stable condition.    FOLLOW UP:  Your Physician  Varies    Schedule an appointment as soon as possible for a visit in 2 days  For re-check    [facility]  [id number]  [facility] [address]  [phone]      If you need a doctor or dentist     [address] [address]  [phone]    If you need a doctor    OUTPATIENT MEDICATIONS:  New Prescriptions    CLINDAMYCIN (CLEOCIN) 150 MG CAP    Take 3 Caps by mouth 3 times a day for 10 days.    HYDROCODONE-ACETAMINOPHEN (NORCO) 5-325 MG TAB PER TABLET    Take 1-2 Tabs by mouth every 6 hours as needed.    IBUPROFEN (MOTRIN) 600 MG TAB    Take 1 Tab by mouth every 6 hours as needed.     FINAL IMPRESSION  1. Facial cellulitis    2. Infected dental carries       Gurjit TEJADA (Kash), am scribing for, and in the presence of, Herb Sandoval M.D.    Electronically signed by: Gurjit Norwood (Kash), 10/9/2017    Herb TEJADA M.D. personally performed the services described in this documentation, as scribed by Gurjit Norwood in my presence, and it is  both accurate and complete.    The note accurately reflects work and decisions made by me.  Herb Sandoval  10/9/2017  5:12 AM

## 2017-10-09 NOTE — ED NOTES
Pt. Ambulated to Red [facility]. Pt. Has right sided facial swelling that he states is from a right upper tooth being broken. Pt. States severe pain. Pt. Hooked up to monitoring equipment, and updated on the POC for ERP to see. Call light within reach. Will continue to monitor.

## 2019-07-22 DIAGNOSIS — R00.2 PALPITATIONS: ICD-10-CM

## 2021-02-17 ENCOUNTER — APPOINTMENT (OUTPATIENT)
Dept: RADIOLOGY | Facility: MEDICAL CENTER | Age: 51
DRG: 917 | End: 2021-02-17
Attending: EMERGENCY MEDICINE
Payer: MEDICARE

## 2021-02-17 ENCOUNTER — HOSPITAL ENCOUNTER (INPATIENT)
Facility: MEDICAL CENTER | Age: 51
LOS: 7 days | DRG: 917 | End: 2021-02-24
Attending: EMERGENCY MEDICINE | Admitting: INTERNAL MEDICINE
Payer: MEDICARE

## 2021-02-17 DIAGNOSIS — T50.902A INTENTIONAL DRUG OVERDOSE, INITIAL ENCOUNTER (HCC): ICD-10-CM

## 2021-02-17 DIAGNOSIS — R40.2431 GLASGOW COMA SCALE TOTAL SCORE 3-8, IN THE FIELD (EMT OR AMBULANCE) (HCC): ICD-10-CM

## 2021-02-17 PROBLEM — N17.9 ACUTE RENAL FAILURE (ARF) (HCC): Status: ACTIVE | Noted: 2021-02-17

## 2021-02-17 PROBLEM — T14.91XA SUICIDAL BEHAVIOR WITH ATTEMPTED SELF-INJURY (HCC): Status: ACTIVE | Noted: 2021-02-17

## 2021-02-17 PROBLEM — Z97.8 ENDOTRACHEALLY INTUBATED: Status: ACTIVE | Noted: 2021-02-17

## 2021-02-17 PROBLEM — D72.829 LEUKOCYTOSIS: Status: ACTIVE | Noted: 2021-02-17

## 2021-02-17 PROBLEM — T50.901A DRUG OVERDOSE: Status: ACTIVE | Noted: 2021-02-17

## 2021-02-17 LAB
ACTION RANGE TRIGGERED IACRT: NO
ACTION RANGE TRIGGERED IACRT: YES
ALBUMIN SERPL BCP-MCNC: 3.8 G/DL (ref 3.2–4.9)
ALBUMIN/GLOB SERPL: 1.9 G/DL
ALP SERPL-CCNC: 75 U/L (ref 30–99)
ALT SERPL-CCNC: 21 U/L (ref 2–50)
AMPHET UR QL SCN: NEGATIVE
ANION GAP SERPL CALC-SCNC: 11 MMOL/L (ref 7–16)
APAP SERPL-MCNC: <5 UG/ML (ref 10–30)
AST SERPL-CCNC: 15 U/L (ref 12–45)
BARBITURATES UR QL SCN: NEGATIVE
BASE EXCESS BLDA CALC-SCNC: -5 MMOL/L (ref -4–3)
BASE EXCESS BLDA CALC-SCNC: -5 MMOL/L (ref -4–3)
BASOPHILS # BLD AUTO: 0.5 % (ref 0–1.8)
BASOPHILS # BLD: 0.08 K/UL (ref 0–0.12)
BENZODIAZ UR QL SCN: POSITIVE
BILIRUB SERPL-MCNC: 0.3 MG/DL (ref 0.1–1.5)
BODY TEMPERATURE: ABNORMAL DEGREES
BODY TEMPERATURE: ABNORMAL DEGREES
BUN SERPL-MCNC: 18 MG/DL (ref 8–22)
BZE UR QL SCN: NEGATIVE
CALCIUM SERPL-MCNC: 8.4 MG/DL (ref 8.5–10.5)
CANNABINOIDS UR QL SCN: NEGATIVE
CHLORIDE SERPL-SCNC: 105 MMOL/L (ref 96–112)
CO2 BLDA-SCNC: 22 MMOL/L (ref 20–33)
CO2 BLDA-SCNC: 23 MMOL/L (ref 20–33)
CO2 SERPL-SCNC: 19 MMOL/L (ref 20–33)
CREAT SERPL-MCNC: 1.23 MG/DL (ref 0.5–1.4)
EKG IMPRESSION: NORMAL
EOSINOPHIL # BLD AUTO: 0.06 K/UL (ref 0–0.51)
EOSINOPHIL NFR BLD: 0.4 % (ref 0–6.9)
ERYTHROCYTE [DISTWIDTH] IN BLOOD BY AUTOMATED COUNT: 46.3 FL (ref 35.9–50)
ETHANOL BLD-MCNC: <10.1 MG/DL (ref 0–10)
FLUAV RNA SPEC QL NAA+PROBE: NEGATIVE
FLUBV RNA SPEC QL NAA+PROBE: NEGATIVE
GLOBULIN SER CALC-MCNC: 2 G/DL (ref 1.9–3.5)
GLUCOSE SERPL-MCNC: 118 MG/DL (ref 65–99)
HCO3 BLDA-SCNC: 21 MMOL/L (ref 17–25)
HCO3 BLDA-SCNC: 21.6 MMOL/L (ref 17–25)
HCT VFR BLD AUTO: 37.2 % (ref 42–52)
HGB BLD-MCNC: 12.3 G/DL (ref 14–18)
HOROWITZ INDEX BLDA+IHG-RTO: 130 MM[HG]
HOROWITZ INDEX BLDA+IHG-RTO: 290 MM[HG]
IMM GRANULOCYTES # BLD AUTO: 0.08 K/UL (ref 0–0.11)
IMM GRANULOCYTES NFR BLD AUTO: 0.5 % (ref 0–0.9)
INST. QUALIFIED PATIENT IIQPT: YES
INST. QUALIFIED PATIENT IIQPT: YES
LYMPHOCYTES # BLD AUTO: 1.77 K/UL (ref 1–4.8)
LYMPHOCYTES NFR BLD: 11.3 % (ref 22–41)
MCH RBC QN AUTO: 30.2 PG (ref 27–33)
MCHC RBC AUTO-ENTMCNC: 33.1 G/DL (ref 33.7–35.3)
MCV RBC AUTO: 91.4 FL (ref 81.4–97.8)
METHADONE UR QL SCN: NEGATIVE
MONOCYTES # BLD AUTO: 0.64 K/UL (ref 0–0.85)
MONOCYTES NFR BLD AUTO: 4.1 % (ref 0–13.4)
NEUTROPHILS # BLD AUTO: 12.98 K/UL (ref 1.82–7.42)
NEUTROPHILS NFR BLD: 83.2 % (ref 44–72)
NRBC # BLD AUTO: 0 K/UL
NRBC BLD-RTO: 0 /100 WBC
O2/TOTAL GAS SETTING VFR VENT: 40 %
O2/TOTAL GAS SETTING VFR VENT: 40 %
OPIATES UR QL SCN: NEGATIVE
OXYCODONE UR QL SCN: NEGATIVE
PCO2 BLDA: 42.5 MMHG (ref 26–37)
PCO2 BLDA: 43.8 MMHG (ref 26–37)
PCO2 TEMP ADJ BLDA: 41 MMHG (ref 26–37)
PCO2 TEMP ADJ BLDA: 41.4 MMHG (ref 26–37)
PCP UR QL SCN: NEGATIVE
PH BLDA: 7.3 [PH] (ref 7.4–7.5)
PH BLDA: 7.3 [PH] (ref 7.4–7.5)
PH TEMP ADJ BLDA: 7.31 [PH] (ref 7.4–7.5)
PH TEMP ADJ BLDA: 7.32 [PH] (ref 7.4–7.5)
PLATELET # BLD AUTO: 232 K/UL (ref 164–446)
PMV BLD AUTO: 8.6 FL (ref 9–12.9)
PO2 BLDA: 116 MMHG (ref 64–87)
PO2 BLDA: 52 MMHG (ref 64–87)
PO2 TEMP ADJ BLDA: 112 MMHG (ref 64–87)
PO2 TEMP ADJ BLDA: 47 MMHG (ref 64–87)
POTASSIUM SERPL-SCNC: 4.4 MMOL/L (ref 3.6–5.5)
PROPOXYPH UR QL SCN: NEGATIVE
PROT SERPL-MCNC: 5.8 G/DL (ref 6–8.2)
RBC # BLD AUTO: 4.07 M/UL (ref 4.7–6.1)
RSV RNA SPEC QL NAA+PROBE: NEGATIVE
SALICYLATES SERPL-MCNC: <1 MG/DL (ref 15–25)
SAO2 % BLDA: 82 % (ref 93–99)
SAO2 % BLDA: 98 % (ref 93–99)
SARS-COV-2 RNA RESP QL NAA+PROBE: NOTDETECTED
SODIUM SERPL-SCNC: 135 MMOL/L (ref 135–145)
SPECIMEN DRAWN FROM PATIENT: ABNORMAL
SPECIMEN DRAWN FROM PATIENT: ABNORMAL
SPECIMEN SOURCE: NORMAL
WBC # BLD AUTO: 15.6 K/UL (ref 4.8–10.8)

## 2021-02-17 PROCEDURE — 93005 ELECTROCARDIOGRAM TRACING: CPT | Performed by: EMERGENCY MEDICINE

## 2021-02-17 PROCEDURE — 700105 HCHG RX REV CODE 258: Performed by: EMERGENCY MEDICINE

## 2021-02-17 PROCEDURE — 36415 COLL VENOUS BLD VENIPUNCTURE: CPT

## 2021-02-17 PROCEDURE — 99223 1ST HOSP IP/OBS HIGH 75: CPT | Mod: AI | Performed by: INTERNAL MEDICINE

## 2021-02-17 PROCEDURE — 94003 VENT MGMT INPAT SUBQ DAY: CPT

## 2021-02-17 PROCEDURE — 700111 HCHG RX REV CODE 636 W/ 250 OVERRIDE (IP): Performed by: INTERNAL MEDICINE

## 2021-02-17 PROCEDURE — 80053 COMPREHEN METABOLIC PANEL: CPT

## 2021-02-17 PROCEDURE — 71045 X-RAY EXAM CHEST 1 VIEW: CPT

## 2021-02-17 PROCEDURE — C1751 CATH, INF, PER/CENT/MIDLINE: HCPCS

## 2021-02-17 PROCEDURE — 99291 CRITICAL CARE FIRST HOUR: CPT | Performed by: INTERNAL MEDICINE

## 2021-02-17 PROCEDURE — 700105 HCHG RX REV CODE 258: Performed by: INTERNAL MEDICINE

## 2021-02-17 PROCEDURE — C9803 HOPD COVID-19 SPEC COLLECT: HCPCS

## 2021-02-17 PROCEDURE — 96365 THER/PROPH/DIAG IV INF INIT: CPT

## 2021-02-17 PROCEDURE — 94002 VENT MGMT INPAT INIT DAY: CPT

## 2021-02-17 PROCEDURE — 303105 HCHG CATHETER EXTRA

## 2021-02-17 PROCEDURE — 80307 DRUG TEST PRSMV CHEM ANLYZR: CPT

## 2021-02-17 PROCEDURE — 36556 INSERT NON-TUNNEL CV CATH: CPT

## 2021-02-17 PROCEDURE — 36600 WITHDRAWAL OF ARTERIAL BLOOD: CPT

## 2021-02-17 PROCEDURE — 51702 INSERT TEMP BLADDER CATH: CPT

## 2021-02-17 PROCEDURE — 80179 DRUG ASSAY SALICYLATE: CPT

## 2021-02-17 PROCEDURE — 770022 HCHG ROOM/CARE - ICU (200)

## 2021-02-17 PROCEDURE — 700111 HCHG RX REV CODE 636 W/ 250 OVERRIDE (IP)

## 2021-02-17 PROCEDURE — 96366 THER/PROPH/DIAG IV INF ADDON: CPT

## 2021-02-17 PROCEDURE — 700101 HCHG RX REV CODE 250: Performed by: EMERGENCY MEDICINE

## 2021-02-17 PROCEDURE — 82803 BLOOD GASES ANY COMBINATION: CPT

## 2021-02-17 PROCEDURE — 96368 THER/DIAG CONCURRENT INF: CPT

## 2021-02-17 PROCEDURE — 80143 DRUG ASSAY ACETAMINOPHEN: CPT

## 2021-02-17 PROCEDURE — 85025 COMPLETE CBC W/AUTO DIFF WBC: CPT

## 2021-02-17 PROCEDURE — 0241U HCHG SARS-COV-2 COVID-19 NFCT DS RESP RNA 4 TRGT MIC: CPT

## 2021-02-17 PROCEDURE — 82077 ASSAY SPEC XCP UR&BREATH IA: CPT

## 2021-02-17 PROCEDURE — 99291 CRITICAL CARE FIRST HOUR: CPT

## 2021-02-17 PROCEDURE — 5A1935Z RESPIRATORY VENTILATION, LESS THAN 24 CONSECUTIVE HOURS: ICD-10-PCS | Performed by: EMERGENCY MEDICINE

## 2021-02-17 RX ORDER — POLYETHYLENE GLYCOL 3350 17 G/17G
1 POWDER, FOR SOLUTION ORAL
Status: DISCONTINUED | OUTPATIENT
Start: 2021-02-17 | End: 2021-02-17

## 2021-02-17 RX ORDER — SODIUM CHLORIDE 9 MG/ML
INJECTION, SOLUTION INTRAVENOUS CONTINUOUS
Status: DISCONTINUED | OUTPATIENT
Start: 2021-02-17 | End: 2021-02-18

## 2021-02-17 RX ORDER — ESCITALOPRAM OXALATE 10 MG/1
10 TABLET ORAL EVERY MORNING
COMMUNITY
End: 2023-07-11

## 2021-02-17 RX ORDER — BISACODYL 10 MG
10 SUPPOSITORY, RECTAL RECTAL
Status: DISCONTINUED | OUTPATIENT
Start: 2021-02-17 | End: 2021-02-24 | Stop reason: HOSPADM

## 2021-02-17 RX ORDER — FAMOTIDINE 20 MG/1
20 TABLET, FILM COATED ORAL EVERY 12 HOURS
Status: DISCONTINUED | OUTPATIENT
Start: 2021-02-17 | End: 2021-02-18

## 2021-02-17 RX ORDER — AMOXICILLIN 250 MG
2 CAPSULE ORAL 2 TIMES DAILY
Status: DISCONTINUED | OUTPATIENT
Start: 2021-02-17 | End: 2021-02-17

## 2021-02-17 RX ORDER — HYDROXYZINE PAMOATE 50 MG/1
50-100 CAPSULE ORAL 4 TIMES DAILY PRN
COMMUNITY
End: 2023-07-11

## 2021-02-17 RX ORDER — ATORVASTATIN CALCIUM 20 MG/1
20 TABLET, FILM COATED ORAL DAILY
COMMUNITY

## 2021-02-17 RX ORDER — AMOXICILLIN 250 MG
2 CAPSULE ORAL 2 TIMES DAILY
Status: DISCONTINUED | OUTPATIENT
Start: 2021-02-17 | End: 2021-02-24 | Stop reason: HOSPADM

## 2021-02-17 RX ORDER — NOREPINEPHRINE BITARTRATE 0.03 MG/ML
0-30 INJECTION, SOLUTION INTRAVENOUS ONCE
Status: COMPLETED | OUTPATIENT
Start: 2021-02-17 | End: 2021-02-17

## 2021-02-17 RX ORDER — SODIUM CHLORIDE, SODIUM LACTATE, POTASSIUM CHLORIDE, CALCIUM CHLORIDE 600; 310; 30; 20 MG/100ML; MG/100ML; MG/100ML; MG/100ML
INJECTION, SOLUTION INTRAVENOUS CONTINUOUS
Status: DISCONTINUED | OUTPATIENT
Start: 2021-02-17 | End: 2021-02-19

## 2021-02-17 RX ORDER — POLYETHYLENE GLYCOL 3350 17 G/17G
1 POWDER, FOR SOLUTION ORAL
Status: DISCONTINUED | OUTPATIENT
Start: 2021-02-17 | End: 2021-02-24 | Stop reason: HOSPADM

## 2021-02-17 RX ORDER — ACETAMINOPHEN 325 MG/1
650 TABLET ORAL EVERY 6 HOURS PRN
Status: DISCONTINUED | OUTPATIENT
Start: 2021-02-17 | End: 2021-02-24 | Stop reason: HOSPADM

## 2021-02-17 RX ORDER — BISACODYL 10 MG
10 SUPPOSITORY, RECTAL RECTAL
Status: DISCONTINUED | OUTPATIENT
Start: 2021-02-17 | End: 2021-02-17

## 2021-02-17 RX ORDER — NOREPINEPHRINE BITARTRATE 0.03 MG/ML
0-30 INJECTION, SOLUTION INTRAVENOUS CONTINUOUS
Status: DISCONTINUED | OUTPATIENT
Start: 2021-02-17 | End: 2021-02-18

## 2021-02-17 RX ORDER — LOSARTAN POTASSIUM 100 MG/1
100 TABLET ORAL DAILY
COMMUNITY

## 2021-02-17 RX ORDER — LABETALOL HYDROCHLORIDE 5 MG/ML
10 INJECTION, SOLUTION INTRAVENOUS EVERY 4 HOURS PRN
Status: DISCONTINUED | OUTPATIENT
Start: 2021-02-17 | End: 2021-02-24 | Stop reason: HOSPADM

## 2021-02-17 RX ORDER — MIRTAZAPINE 15 MG/1
7.5 TABLET, FILM COATED ORAL SEE ADMIN INSTRUCTIONS
Status: ON HOLD | COMMUNITY
End: 2021-02-24

## 2021-02-17 RX ORDER — SODIUM CHLORIDE 9 MG/ML
1000 INJECTION, SOLUTION INTRAVENOUS ONCE
Status: COMPLETED | OUTPATIENT
Start: 2021-02-17 | End: 2021-02-17

## 2021-02-17 RX ORDER — M-VIT,TX,IRON,MINS/CALC/FOLIC 27MG-0.4MG
1 TABLET ORAL DAILY
COMMUNITY

## 2021-02-17 RX ORDER — OLANZAPINE 10 MG/1
10 TABLET ORAL
COMMUNITY
End: 2023-07-11

## 2021-02-17 RX ADMIN — PROPOFOL 10 MCG/KG/MIN: 10 INJECTION, EMULSION INTRAVENOUS at 17:00

## 2021-02-17 RX ADMIN — FAMOTIDINE 20 MG: 10 INJECTION INTRAVENOUS at 20:47

## 2021-02-17 RX ADMIN — FENTANYL CITRATE 100 MCG: 50 INJECTION, SOLUTION INTRAMUSCULAR; INTRAVENOUS at 20:54

## 2021-02-17 RX ADMIN — PROPOFOL 50 MCG/KG/MIN: 10 INJECTION, EMULSION INTRAVENOUS at 21:08

## 2021-02-17 RX ADMIN — SODIUM CHLORIDE, POTASSIUM CHLORIDE, SODIUM LACTATE AND CALCIUM CHLORIDE: 600; 310; 30; 20 INJECTION, SOLUTION INTRAVENOUS at 21:55

## 2021-02-17 RX ADMIN — SODIUM CHLORIDE 1000 ML: 9 INJECTION, SOLUTION INTRAVENOUS at 20:08

## 2021-02-17 RX ADMIN — NOREPINEPHRINE BITARTRATE 5 MCG/MIN: 1 INJECTION, SOLUTION, CONCENTRATE INTRAVENOUS at 16:42

## 2021-02-17 ASSESSMENT — LIFESTYLE VARIABLES
REASON UNABLE TO ASSESS: INTUBATED/SEDATED
DOES PATIENT WANT TO STOP DRINKING: CANNOT ASSESS

## 2021-02-17 ASSESSMENT — FIBROSIS 4 INDEX: FIB4 SCORE: 0.68

## 2021-02-18 PROBLEM — E86.1 HYPOTENSION DUE TO HYPOVOLEMIA: Status: ACTIVE | Noted: 2021-02-18

## 2021-02-18 PROBLEM — F20.9 SCHIZOPHRENIA (HCC): Status: ACTIVE | Noted: 2021-02-18

## 2021-02-18 PROBLEM — J96.01 ACUTE RESPIRATORY FAILURE WITH HYPOXIA (HCC): Status: ACTIVE | Noted: 2021-02-18

## 2021-02-18 PROBLEM — I95.89 HYPOTENSION DUE TO HYPOVOLEMIA: Status: ACTIVE | Noted: 2021-02-18

## 2021-02-18 LAB
ACTION RANGE TRIGGERED IACRT: NO
ANION GAP SERPL CALC-SCNC: 8 MMOL/L (ref 7–16)
BASE EXCESS BLDA CALC-SCNC: -5 MMOL/L (ref -4–3)
BASOPHILS # BLD AUTO: 0.5 % (ref 0–1.8)
BASOPHILS # BLD: 0.07 K/UL (ref 0–0.12)
BODY TEMPERATURE: ABNORMAL DEGREES
BUN SERPL-MCNC: 14 MG/DL (ref 8–22)
CALCIUM SERPL-MCNC: 8.2 MG/DL (ref 8.5–10.5)
CHLORIDE SERPL-SCNC: 107 MMOL/L (ref 96–112)
CO2 BLDA-SCNC: 23 MMOL/L (ref 20–33)
CO2 SERPL-SCNC: 21 MMOL/L (ref 20–33)
CREAT SERPL-MCNC: 0.84 MG/DL (ref 0.5–1.4)
EOSINOPHIL # BLD AUTO: 0.09 K/UL (ref 0–0.51)
EOSINOPHIL NFR BLD: 0.7 % (ref 0–6.9)
ERYTHROCYTE [DISTWIDTH] IN BLOOD BY AUTOMATED COUNT: 46.7 FL (ref 35.9–50)
GLUCOSE SERPL-MCNC: 143 MG/DL (ref 65–99)
HCO3 BLDA-SCNC: 21.4 MMOL/L (ref 17–25)
HCT VFR BLD AUTO: 34.4 % (ref 42–52)
HGB BLD-MCNC: 11.4 G/DL (ref 14–18)
HOROWITZ INDEX BLDA+IHG-RTO: 294 MM[HG]
IMM GRANULOCYTES # BLD AUTO: 0.07 K/UL (ref 0–0.11)
IMM GRANULOCYTES NFR BLD AUTO: 0.5 % (ref 0–0.9)
INST. QUALIFIED PATIENT IIQPT: YES
LYMPHOCYTES # BLD AUTO: 1.74 K/UL (ref 1–4.8)
LYMPHOCYTES NFR BLD: 13.2 % (ref 22–41)
MAGNESIUM SERPL-MCNC: 1.8 MG/DL (ref 1.5–2.5)
MCH RBC QN AUTO: 30.2 PG (ref 27–33)
MCHC RBC AUTO-ENTMCNC: 33.1 G/DL (ref 33.7–35.3)
MCV RBC AUTO: 91 FL (ref 81.4–97.8)
MONOCYTES # BLD AUTO: 0.81 K/UL (ref 0–0.85)
MONOCYTES NFR BLD AUTO: 6.2 % (ref 0–13.4)
NEUTROPHILS # BLD AUTO: 10.38 K/UL (ref 1.82–7.42)
NEUTROPHILS NFR BLD: 78.9 % (ref 44–72)
NRBC # BLD AUTO: 0 K/UL
NRBC BLD-RTO: 0 /100 WBC
O2/TOTAL GAS SETTING VFR VENT: 50 %
PCO2 BLDA: 43.1 MMHG (ref 26–37)
PCO2 TEMP ADJ BLDA: 42 MMHG (ref 26–37)
PH BLDA: 7.3 [PH] (ref 7.4–7.5)
PH TEMP ADJ BLDA: 7.31 [PH] (ref 7.4–7.5)
PHOSPHATE SERPL-MCNC: 3.2 MG/DL (ref 2.5–4.5)
PLATELET # BLD AUTO: 222 K/UL (ref 164–446)
PMV BLD AUTO: 8.5 FL (ref 9–12.9)
PO2 BLDA: 147 MMHG (ref 64–87)
PO2 TEMP ADJ BLDA: 144 MMHG (ref 64–87)
POTASSIUM SERPL-SCNC: 4.1 MMOL/L (ref 3.6–5.5)
RBC # BLD AUTO: 3.78 M/UL (ref 4.7–6.1)
SAO2 % BLDA: 99 % (ref 93–99)
SODIUM SERPL-SCNC: 136 MMOL/L (ref 135–145)
SPECIMEN DRAWN FROM PATIENT: ABNORMAL
WBC # BLD AUTO: 13.2 K/UL (ref 4.8–10.8)

## 2021-02-18 PROCEDURE — 94003 VENT MGMT INPAT SUBQ DAY: CPT

## 2021-02-18 PROCEDURE — 36600 WITHDRAWAL OF ARTERIAL BLOOD: CPT

## 2021-02-18 PROCEDURE — 700111 HCHG RX REV CODE 636 W/ 250 OVERRIDE (IP): Performed by: INTERNAL MEDICINE

## 2021-02-18 PROCEDURE — A9270 NON-COVERED ITEM OR SERVICE: HCPCS | Performed by: HOSPITALIST

## 2021-02-18 PROCEDURE — 700105 HCHG RX REV CODE 258: Performed by: INTERNAL MEDICINE

## 2021-02-18 PROCEDURE — 99233 SBSQ HOSP IP/OBS HIGH 50: CPT | Performed by: INTERNAL MEDICINE

## 2021-02-18 PROCEDURE — 99223 1ST HOSP IP/OBS HIGH 75: CPT | Performed by: PSYCHIATRY & NEUROLOGY

## 2021-02-18 PROCEDURE — 85025 COMPLETE CBC W/AUTO DIFF WBC: CPT

## 2021-02-18 PROCEDURE — 770022 HCHG ROOM/CARE - ICU (200)

## 2021-02-18 PROCEDURE — 83735 ASSAY OF MAGNESIUM: CPT

## 2021-02-18 PROCEDURE — 94150 VITAL CAPACITY TEST: CPT

## 2021-02-18 PROCEDURE — 80048 BASIC METABOLIC PNL TOTAL CA: CPT

## 2021-02-18 PROCEDURE — 700101 HCHG RX REV CODE 250: Performed by: INTERNAL MEDICINE

## 2021-02-18 PROCEDURE — 700102 HCHG RX REV CODE 250 W/ 637 OVERRIDE(OP): Performed by: HOSPITALIST

## 2021-02-18 PROCEDURE — 94799 UNLISTED PULMONARY SVC/PX: CPT

## 2021-02-18 PROCEDURE — 82803 BLOOD GASES ANY COMBINATION: CPT

## 2021-02-18 PROCEDURE — 84100 ASSAY OF PHOSPHORUS: CPT

## 2021-02-18 PROCEDURE — 99233 SBSQ HOSP IP/OBS HIGH 50: CPT | Performed by: HOSPITALIST

## 2021-02-18 RX ORDER — MAGNESIUM SULFATE HEPTAHYDRATE 40 MG/ML
2 INJECTION, SOLUTION INTRAVENOUS ONCE
Status: COMPLETED | OUTPATIENT
Start: 2021-02-18 | End: 2021-02-18

## 2021-02-18 RX ORDER — FAMOTIDINE 20 MG/1
20 TABLET, FILM COATED ORAL 2 TIMES DAILY
Status: DISCONTINUED | OUTPATIENT
Start: 2021-02-18 | End: 2021-02-24

## 2021-02-18 RX ADMIN — LABETALOL HYDROCHLORIDE 10 MG: 5 INJECTION, SOLUTION INTRAVENOUS at 21:13

## 2021-02-18 RX ADMIN — PROPOFOL 50 MCG/KG/MIN: 10 INJECTION, EMULSION INTRAVENOUS at 02:43

## 2021-02-18 RX ADMIN — MAGNESIUM SULFATE 2 G: 2 INJECTION INTRAVENOUS at 08:15

## 2021-02-18 RX ADMIN — SODIUM CHLORIDE, POTASSIUM CHLORIDE, SODIUM LACTATE AND CALCIUM CHLORIDE: 600; 310; 30; 20 INJECTION, SOLUTION INTRAVENOUS at 05:50

## 2021-02-18 RX ADMIN — FAMOTIDINE 20 MG: 10 INJECTION INTRAVENOUS at 05:06

## 2021-02-18 RX ADMIN — ENOXAPARIN SODIUM 40 MG: 40 INJECTION SUBCUTANEOUS at 05:06

## 2021-02-18 RX ADMIN — FAMOTIDINE 20 MG: 20 TABLET ORAL at 17:46

## 2021-02-18 ASSESSMENT — FIBROSIS 4 INDEX: FIB4 SCORE: 0.71

## 2021-02-18 ASSESSMENT — COPD QUESTIONNAIRES
COPD SCREENING SCORE: 6
DO YOU EVER COUGH UP ANY MUCUS OR PHLEGM?: YES, EVERY DAY
HAVE YOU SMOKED AT LEAST 100 CIGARETTES IN YOUR ENTIRE LIFE: YES
DURING THE PAST 4 WEEKS HOW MUCH DID YOU FEEL SHORT OF BREATH: SOME OF THE TIME

## 2021-02-18 ASSESSMENT — LIFESTYLE VARIABLES: EVER_SMOKED: YES

## 2021-02-18 ASSESSMENT — PULMONARY FUNCTION TESTS: FVC: 1.5

## 2021-02-18 NOTE — ASSESSMENT & PLAN NOTE
Overdosed on a panacea of polypharmacy.  UDS positive for benzodiazepines only  Salicylates, blood alcohol and acetaminophen negative  LOC and hemodynamics improved  Psych eval pending  Legal hold

## 2021-02-18 NOTE — ED NOTES
Report to ICU RN x 2. Pt transported to floor with karlos, on ventilator, with RN x 2 on cardiac monitor with respiratory therapist. Floor RN aware of infusions running up transporting pt.
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
## 2021-02-18 NOTE — ASSESSMENT & PLAN NOTE
Patient report by EDP and  patient had intentionally overdosed  Suicidal behavior  Legal hold initiated, psychiatry consulting  Monitor for possible effects from polydrug overdose

## 2021-02-18 NOTE — DISCHARGE PLANNING
Filed petition to the court via [employer]. Waiting on verified petition.    1504  Received verified petition from the court. Sent copy to unit RNCM.

## 2021-02-18 NOTE — H&P
Hospital Medicine History & Physical Note    Date of Service  2/17/2021    Primary Care Physician  Pcp Pt States None    Consultants  Critical Care    Code Status  Prior    Chief Complaint  Chief Complaint   Patient presents with   • Drug Overdose     polypharmacy drug OD around 1200 today, found by mother at 1230      History of Presenting Illness  50 y.o. male who presented 2/17/2021 with transfer from [facility]  patient after attempted suicide by drug overdose.  Patient was transferred intubated.  Patient had taken all his prescription medications.  No family at bedside.  Critical care consulted and accepted for ICU care.  As per social work note, patient was found down by his mother at 12:30 PM, immediately took patient to [facility].  At that time patient was confused and combative and was given medications and intubated.  Nimo Mckinney (mother) [phone]    As per ED patient is currently hemodynamically unstable, requiring vasopressors and intubated did arrive with elevated WBC 15.6, creatinine 1.23    Review of Systems  Review of Systems   Unable to perform ROS: Medical condition   Patient currently intubated no family at bedside    Past Medical History   has a past medical history of ADD (attention deficit disorder), ASTHMA, Bipolar 2 disorder (HCC), Deafness, Hypertension, and Psychiatric disorder.    Surgical History   has a past surgical history that includes other abdominal surgery.     Family History  family history is not on file.     Social History   reports that he has been smoking cigarettes. He has been smoking about 2.00 packs per day. He uses smokeless tobacco. He reports current alcohol use. He reports current drug use.    Allergies  Allergies   Allergen Reactions   • Orange        Medications  Prior to Admission Medications   Prescriptions Last Dose Informant Patient Reported? Taking?   hydrocodone-acetaminophen (NORCO) 5-325 MG Tab per tablet   No No   Sig:  Take 1-2 Tabs by mouth every 6 hours as needed.   ibuprofen (MOTRIN) 600 MG Tab   No No   Sig: Take 1 Tab by mouth every 6 hours as needed.   lorazepam (ATIVAN) 0.5 MG Tab   No No   Sig: Take 1 Tab by mouth every four hours as needed for Anxiety.   mupirocin (BACTROBAN) 2 % Ointment   No No   Sig: Apply 1 Application to affected area(s) 2 times a day.   oxcarbazepine (TRILEPTAL) 300 MG Tab  Patient Yes No   Sig: Take 300 mg by mouth 2 times a day.   sulfamethoxazole-trimethoprim (BACTRIM DS) 800-160 MG tablet   No No   Sig: Take 1 Tab by mouth 2 times a day.   topiramate (TOPAMAX) 25 MG Tab  Patient Yes No   Sig: Take 25 mg by mouth 2 times a day.   trazodone (DESYREL) 100 MG Tab   Yes No   Sig: Take 100 mg by mouth every evening.   ziprasidone (GEODON) 60 MG Cap   Yes No   Sig: Take 120 mg by mouth every bedtime.      Facility-Administered Medications: None       Physical Exam  Temp:  [36.5 °C (97.7 °F)] 36.5 °C (97.7 °F)  Pulse:  [75-86] 75  Resp:  [16-27] 22  BP: ()/(49-80) 122/65  SpO2:  [91 %-100 %] 100 %    Physical Exam  Vitals and nursing note reviewed.   Constitutional:       General: He is not in acute distress.     Comments: Intubated  No evidence of wrist wounds   HENT:      Head: Normocephalic and atraumatic.   Cardiovascular:      Rate and Rhythm: Normal rate and regular rhythm.      Pulses: Normal pulses.      Heart sounds: Normal heart sounds. No murmur.      Comments: On pressors  Pulmonary:      Effort: No respiratory distress.      Comments: Intubated breath sounds  Abdominal:      General: Abdomen is flat. Bowel sounds are normal. There is no distension.      Palpations: Abdomen is soft.   Musculoskeletal:         General: No swelling. Normal range of motion.   Skin:     General: Skin is warm.      Capillary Refill: Capillary refill takes less than 2 seconds.      Coloration: Skin is not jaundiced.   Neurological:      Comments: GCS 3 T   Psychiatric:      Comments: Unable to assess as  patient is sedated and intubated         Laboratory:  Recent Labs     02/17/21  1650   WBC 15.6*   RBC 4.07*   HEMOGLOBIN 12.3*   HEMATOCRIT 37.2*   MCV 91.4   MCH 30.2   MCHC 33.1*   RDW 46.3   PLATELETCT 232   MPV 8.6*     Recent Labs     02/17/21  1650   SODIUM 135   POTASSIUM 4.4   CHLORIDE 105   CO2 19*   GLUCOSE 118*   BUN 18   CREATININE 1.23   CALCIUM 8.4*     Recent Labs     02/17/21  1650   ALTSGPT 21   ASTSGOT 15   ALKPHOSPHAT 75   TBILIRUBIN 0.3   GLUCOSE 118*         No results for input(s): NTPROBNP in the last 72 hours.      No results for input(s): TROPONINT in the last 72 hours.    Imaging:  DX-CHEST-PORTABLE (1 VIEW)   Final Result      1.  Well-positioned right IJ central catheter and ETT. No pneumothorax.   2.  NGT tip is in the distal esophagus. Recommend advancing 10 cm.   3.  Lungs are clear.        Assessment/Plan:  I anticipate this patient will require at least two midnights for appropriate medical management, necessitating inpatient admission.    * Drug overdose- (present on admission)  Assessment & Plan  Patient report by EDP and  patient had intentionally overdosed  Suicidal behavior  Patient will need psychiatric hold    Suicidal behavior with attempted self-injury (HCC)- (present on admission)  Assessment & Plan  Patient will need psychiatric hold  We will need psychiatric consult once extubated    Endotracheally intubated- (present on admission)  Assessment & Plan  Patient transferred [facility] intubated due to drug overdose  Critical care consulted, ICU admission  ET tube care as per RT, ICU protocols  Ventilation management per ICU physician    Acute renal failure (ARF) (HCC)- (present on admission)  Assessment & Plan  Patient's creatinine 1.23 unclear on baseline  Monitor urine output  IV fluids  Avoid nephrotoxins    Leukocytosis- (present on admission)  Assessment & Plan  Patient WBC 15.6  Concern for aspiration pneumonia  Will need monitoring for  fevers or changes    DVT prophylaxis: Lovenox

## 2021-02-18 NOTE — PROGRESS NOTES
Patient extubated this morning, now alert, oriented, and able to vocalize post-extubation. Patient expresses ongoing suicidal ideation without specific plan, but with prior attempts to complete suicide. Patient does not recall circumstances leading up to current hospitalization. Legal hold process initiated in coordination with MD Quezada and case management. Suicide precautions ordered/initiated per protocol. 1:1 observation initiated.

## 2021-02-18 NOTE — PROGRESS NOTES
Critical Care Progress Note    Date of admission  2/17/2021    Chief Complaint  50 y.o. male who presented 2/17/2021 with overdose.  Pt took intentional overdose of his own medications.  Pt brought to OSH for evaluation.  At OSH pt became progressively more somnolent and was intubated to protect his airway.  Pt transferred to [facility] for evaluation and treatment of overdose. (Dr Healy HPI 2/17)    Hospital Course  2/18 - Extubation, weaned off pressors, Alert      Interval Problem Update  Reviewed last 24 hour events:    Alert and follows well  GCS 11, moves all 4, NFE  SR 70s  SBp 80-110s  NE 7 -> off  UO adequate  V#2 PEEP 8, 50%  ABG 7.31/42/144  CXR no film - clear after CVC  Tm 97.9  WBC 13.2      Extubate/IS, tolerated  Psych eval requested  Legal hold orders completed        Review of Systems  Review of Systems   Unable to perform ROS: Intubated    Denies SOB/pain    Vital Signs for last 24 hours   Temp:  [36.4 °C (97.5 °F)-36.6 °C (97.9 °F)] 36.5 °C (97.7 °F)  Pulse:  [] 89  Resp:  [10-24] 19  BP: ()/() 136/86  SpO2:  [93 %-100 %] 97 %    Hemodynamic parameters for last 24 hours       Respiratory Information for the last 24 hours  Vent Mode: APVCMV  Rate (breaths/min): 20  Vt Target (mL): 380  PEEP/CPAP: 8  P Support: 5  MAP: 11  Length of Weaning Trial (Hours): 1   Control VTE (exp VT): 391    Physical Exam   Physical Exam  Vitals reviewed.   Constitutional:       Appearance: He is normal weight. He is not toxic-appearing.      Interventions: He is intubated and restrained.   HENT:      Head: Normocephalic and atraumatic.      Mouth/Throat:      Mouth: Mucous membranes are moist.   Eyes:      General: No scleral icterus.     Extraocular Movements: Extraocular movements intact.      Pupils: Pupils are equal, round, and reactive to light.   Cardiovascular:      Rate and Rhythm: Normal rate and regular rhythm.      Pulses: Normal pulses.      Heart sounds: No murmur. No gallop.    Pulmonary:       Effort: Pulmonary effort is normal. He is intubated.      Breath sounds: No wheezing, rhonchi or rales.   Abdominal:      General: Abdomen is flat. Bowel sounds are normal. There is no distension.      Palpations: Abdomen is soft.      Tenderness: There is no abdominal tenderness. There is no right CVA tenderness or left CVA tenderness.   Musculoskeletal:      Cervical back: Neck supple. No rigidity.      Right lower leg: No edema.      Left lower leg: No edema.   Lymphadenopathy:      Cervical: No cervical adenopathy.   Skin:     General: Skin is warm and dry.      Capillary Refill: Capillary refill takes less than 2 seconds.      Coloration: Skin is not cyanotic.      Findings: No lesion.      Nails: There is no clubbing.   Neurological:      General: No focal deficit present.      Mental Status: He is alert.      Cranial Nerves: No cranial nerve deficit.      Sensory: No sensory deficit.      Motor: No weakness.   Psychiatric:         Behavior: Behavior is cooperative.         Medications  Current Facility-Administered Medications   Medication Dose Route Frequency Provider Last Rate Last Admin   • famotidine (PEPCID) tablet 20 mg  20 mg Oral BID Mo Krishnamurthy M.D.   20 mg at 02/18/21 1746   • senna-docusate (PERICOLACE or SENOKOT S) 8.6-50 MG per tablet 2 tablet  2 tablet Oral BID GLORY Hannah.O.   Stopped at 02/17/21 2030    And   • polyethylene glycol/lytes (MIRALAX) PACKET 1 Packet  1 Packet Oral QDAY PRN GLORY Hannah.O.        And   • magnesium hydroxide (MILK OF MAGNESIA) suspension 30 mL  30 mL Oral QDAY PRN Luis Healy D.O.        And   • bisacodyl (DULCOLAX) suppository 10 mg  10 mg Rectal QDAY PRN Luis Healy D.O.       • lactated ringers infusion   Intravenous Continuous GLORY Hannah.O. 125 mL/hr at 02/18/21 0550 New Bag at 02/18/21 0550   • enoxaparin (LOVENOX) inj 40 mg  40 mg Subcutaneous DAILY Luis Healy D.O.   40 mg at 02/18/21 0506   • acetaminophen (Tylenol)  tablet 650 mg  650 mg Oral Q6HRS PRN Luis Healy D.O.       • labetalol (NORMODYNE/TRANDATE) injection 10 mg  10 mg Intravenous Q4HRS PRN Luis Healy D.O.       • Respiratory Therapy Consult   Nebulization Continuous RT Luis Healy D.O.       • MD Alert...ICU Electrolyte Replacement per Pharmacy   Other PHARMACY TO DOSE Luis Healy D.O.           Fluids    Intake/Output Summary (Last 24 hours) at 2/18/2021 1938  Last data filed at 2/18/2021 1800  Gross per 24 hour   Intake 2342.63 ml   Output 1815 ml   Net 527.63 ml       Laboratory  Recent Labs     02/17/21  1813 02/17/21  2224 02/18/21  0324   ISTATAPH 7.302* 7.301* 7.304*   ISTATAPCO2 42.5* 43.8* 43.1*   ISTATAPO2 116* 52* 147*   ISTATATCO2 22 23 23   [id number] 98 82* 99   ISTATARTHCO3 21.0 21.6 21.4   ISTATARTBE -5* -5* -5*   ISTATTEMP 97.5 F 35.5 C 97.6 F   ISTATFIO2 40 40 50   ISTATSPEC Arterial Arterial Arterial   ISTATAPHTC 7.310* 7.322* 7.312*   [id number] 112* 47* 144*         Recent Labs     02/17/21  1650 02/18/21  0300   SODIUM 135 136   POTASSIUM 4.4 4.1   CHLORIDE 105 107   CO2 19* 21   BUN 18 14   CREATININE 1.23 0.84   MAGNESIUM  --  1.8   PHOSPHORUS  --  3.2   CALCIUM 8.4* 8.2*     Recent Labs     02/17/21  1650 02/18/21  0300   ALTSGPT 21  --    ASTSGOT 15  --    ALKPHOSPHAT 75  --    TBILIRUBIN 0.3  --    GLUCOSE 118* 143*     Recent Labs     02/17/21  1650 02/18/21  0300   WBC 15.6* 13.2*   NEUTSPOLYS 83.20* 78.90*   LYMPHOCYTES 11.30* 13.20*   MONOCYTES 4.10 6.20   EOSINOPHILS 0.40 0.70   BASOPHILS 0.50 0.50   ASTSGOT 15  --    ALTSGPT 21  --    ALKPHOSPHAT 75  --    TBILIRUBIN 0.3  --      Recent Labs     02/17/21  1650 02/18/21  0300   RBC 4.07* 3.78*   HEMOGLOBIN 12.3* 11.4*   HEMATOCRIT 37.2* 34.4*   PLATELETCT 232 222       Imaging  X-Ray:  I have personally reviewed the images and compared with prior images.  EKG:  I have personally reviewed the images and compared with prior images.    Assessment/Plan  * Acute  respiratory failure with hypoxia (HCC)  Assessment & Plan  Intubated 2/17 after apparent overdose  RT protocol/ventilator bundle  SAT/SBT as tolerated  Extubation trial today, patient improving    Hypotension due to hypovolemia  Assessment & Plan  Resolving, initial requiring fluids and IV pressors  CVP monitoring as needed  Further fluid resuscitation as needed  Suspect related to overdose on positive pressure ventilation    Drug overdose- (present on admission)  Assessment & Plan  Overdosed on a panacea of polypharmacy.  UDS positive for benzodiazepines only  Salicylates, blood alcohol and acetaminophen negative  LOC and hemodynamics improved  Psych eval pending  Legal hold    Schizophrenia (HCC)  Assessment & Plan  Psychiatric evaluation pending    Suicidal behavior with attempted self-injury (HCC)- (present on admission)  Assessment & Plan  Legal hold initiated, psychiatric evaluation pending  Sitter  Keep in ICU for now    Leukocytosis- (present on admission)  Assessment & Plan  Improved, monitoring for aspiration pneumonia in particular, clinically actually doing well       VTE:  Lovenox  Ulcer: H2 Antagonist  Lines: Central Line  Ongoing indication addressed and Celeste Catheter  Ongoing indication addressed    I have performed a physical exam and reviewed and updated ROS and Plan today (2/18/2021). In review of yesterday's note (2/17/2021), there are no changes except as documented above.     Discussed patient condition and risk of morbidity and/or mortality with Hospitalist, RN, RT, Pharmacy, , Charge nurse / hot rounds and Patient

## 2021-02-18 NOTE — ED PROVIDER NOTES
ED Provider Note    CHIEF COMPLAINT  Chief Complaint   Patient presents with   • Drug Overdose     polypharmacy drug OD around 1200 today, found by mother at 1230        HPI  Zev Mckinney is a 50 y.o. male who presents intubated as a transfer after intentional overdose.  Seems though he just took handfuls of his regular medications which included Escitalopram, Vistaril, oxcarbazepine, Lipitor losartan, mirtazapine and olanzapine.  At the outside facility the patient was initially somnolent but had a progressive decline in his mental status and ultimately required intubation as the flight crew reports that his GCS was 6.  Upon transport the patient's blood pressure decreased requiring boluses of epinephrine.  He apparently has a history of psychiatric disease such as ADD, bipolar and depression.  No other history is available at this time.    REVIEW OF SYSTEMS  Unobtainable    PAST MEDICAL HISTORY  Past Medical History:   Diagnosis Date   • ADD (attention deficit disorder)    • ASTHMA    • Bipolar 2 disorder (HCC)    • Deafness     wears 2 hearing aids   • Hypertension    • Psychiatric disorder        FAMILY HISTORY  History reviewed. No pertinent family history.    SOCIAL HISTORY  Social History     Tobacco Use   • Smoking status: Current Every Day Smoker     Packs/day: 2.00     Types: Cigarettes   • Smokeless tobacco: Current User   • Tobacco comment: 5 cigs per day   Substance Use Topics   • Alcohol use: Yes   • Drug use: Yes     Comment: meth       SURGICAL HISTORY  Past Surgical History:   Procedure Laterality Date   • OTHER ABDOMINAL SURGERY      gastric ulcer       CURRENT MEDICATIONS  I personally reviewed the medication list in the charting documentation.     ALLERGIES  Allergies   Allergen Reactions   • Spink        MEDICAL RECORD  I have reviewed patient's medical record and pertinent results are listed above.      PHYSICAL EXAM  VITAL SIGNS: /59   Pulse 80   Temp 36.5 °C (97.7 °F) (Temporal)  "  Resp (!) 22   Ht 1.651 m (5' 5\")   Wt 68 kg (150 lb)   SpO2 100%   BMI 24.96 kg/m²    Constitutional: Unresponsive, intubated  HENT: Normocephalic, no evidence of acute trauma.  Eyes: No scleral icterus. Normal conjunctiva   Neck: Supple   Cardiovascular: Regular heart rate and rhythm.   Thorax & Lungs: Symmetric breath sounds with ventilations   Abdomen: Soft, not distended  Skin: Warm, dry. No rash appreciated  Extremities/Musculoskeletal: No signs of acute trauma  Neurologic: Intubated, does withdraw all 4 extremities to painful stimulus    DIAGNOSTIC STUDIES / PROCEDURES    LABS/EKGs  Results for orders placed or performed during the hospital encounter of 02/17/21   URINE DRUG SCREEN (TRIAGE)   Result Value Ref Range    Amphetamines Urine Negative Negative    Barbiturates Negative Negative    Benzodiazepines Positive (A) Negative    Cocaine Metabolite Negative Negative    Methadone Negative Negative    Opiates Negative Negative    Oxycodone Negative Negative    Phencyclidine -Pcp Negative Negative    Propoxyphene Negative Negative    Cannabinoid Metab Negative Negative   Blood Alcohol   Result Value Ref Range    Diagnostic Alcohol <10.1 0.0 - 10.0 mg/dL   CBC WITH DIFFERENTIAL   Result Value Ref Range    WBC 15.6 (H) 4.8 - 10.8 K/uL    RBC 4.07 (L) 4.70 - 6.10 M/uL    Hemoglobin 12.3 (L) 14.0 - 18.0 g/dL    Hematocrit 37.2 (L) 42.0 - 52.0 %    MCV 91.4 81.4 - 97.8 fL    MCH 30.2 27.0 - 33.0 pg    MCHC 33.1 (L) 33.7 - 35.3 g/dL    RDW 46.3 35.9 - 50.0 fL    Platelet Count 232 164 - 446 K/uL    MPV 8.6 (L) 9.0 - 12.9 fL    Neutrophils-Polys 83.20 (H) 44.00 - 72.00 %    Lymphocytes 11.30 (L) 22.00 - 41.00 %    Monocytes 4.10 0.00 - 13.40 %    Eosinophils 0.40 0.00 - 6.90 %    Basophils 0.50 0.00 - 1.80 %    Immature Granulocytes 0.50 0.00 - 0.90 %    Nucleated RBC 0.00 /100 WBC    Neutrophils (Absolute) 12.98 (H) 1.82 - 7.42 K/uL    Lymphs (Absolute) 1.77 1.00 - 4.80 K/uL    Monos (Absolute) 0.64 0.00 - 0.85 " K/uL    Eos (Absolute) 0.06 0.00 - 0.51 K/uL    Baso (Absolute) 0.08 0.00 - 0.12 K/uL    Immature Granulocytes (abs) 0.08 0.00 - 0.11 K/uL    NRBC (Absolute) 0.00 K/uL   COMP METABOLIC PANEL   Result Value Ref Range    Sodium 135 135 - 145 mmol/L    Potassium 4.4 3.6 - 5.5 mmol/L    Chloride 105 96 - 112 mmol/L    Co2 19 (L) 20 - 33 mmol/L    Anion Gap 11.0 7.0 - 16.0    Glucose 118 (H) 65 - 99 mg/dL    Bun 18 8 - 22 mg/dL    Creatinine 1.23 0.50 - 1.40 mg/dL    Calcium 8.4 (L) 8.5 - 10.5 mg/dL    AST(SGOT) 15 12 - 45 U/L    ALT(SGPT) 21 2 - 50 U/L    Alkaline Phosphatase 75 30 - 99 U/L    Total Bilirubin 0.3 0.1 - 1.5 mg/dL    Albumin 3.8 3.2 - 4.9 g/dL    Total Protein 5.8 (L) 6.0 - 8.2 g/dL    Globulin 2.0 1.9 - 3.5 g/dL    A-G Ratio 1.9 g/dL   Acetaminophen Level   Result Value Ref Range    Acetaminophen -Tylenol <5 (L) 10 - 30 ug/mL   SALICYLATE LEVEL   Result Value Ref Range    Salicylates, Quant. <1 (L) 15 - 25 mg/dL   ESTIMATED GFR   Result Value Ref Range    GFR If African American >60 >60 mL/min/1.73 m 2    GFR If Non African American >60 >60 mL/min/1.73 m 2   COV-2, FLU A/B, AND RSV BY PCR (2-4 HOURS CEPHEID): Collect NP swab in VTM    Specimen: Respirate   Result Value Ref Range    Influenza virus A RNA Negative Negative    Influenza virus B, PCR Negative Negative    RSV, PCR Negative Negative    SARS-CoV-2 by PCR NotDetected     SARS-CoV-2 Source NP Swab    EKG   Result Value Ref Range    Report       [facility] Emergency Dept.    Test Date:  2021  Pt Name:    CURTIS PICKERING                  Department:   MRN:        [id number]                      Room:        05  Gender:     Male                         Technician: [id number]  :        1970                   Requested By:TREY TOSCANO  Order #:    [id number]                    Reading MD:    Measurements  Intervals                                Axis  Rate:       80                           P:          77  DE:          200                          QRS:        65  QRSD:       109                          T:          34  QT:         403  QTc:        465    Interpretive Statements  Sinus rhythm  Anterior infarct, old  Compared to ECG 05/02/2008 17:01:04  Myocardial infarct finding now present     ISTAT ARTERIAL BLOOD GAS   Result Value Ref Range    Ph 7.302 (L) 7.400 - 7.500    Pco2 42.5 (H) 26.0 - 37.0 mmHg    Po2 116 (H) 64 - 87 mmHg    Tco2 22 20 - 33 mmol/L    S02 98 93 - 99 %    Hco3 21.0 17.0 - 25.0 mmol/L    BE -5 (L) -4 - 3 mmol/L    Body Temp 97.5 F degrees    O2 Therapy 40 %    iPF Ratio 290     Ph Temp Dinorah 7.310 (L) 7.400 - 7.500    Pco2 Temp Co 41.4 (H) 26.0 - 37.0 mmHg    Po2 Temp Cor 112 (H) 64 - 87 mmHg    Specimen Arterial     Action Range Triggered NO     Inst. Qualified Patient YES         RADIOLOGY  DX-CHEST-PORTABLE (1 VIEW)   Final Result      1.  Well-positioned right IJ central catheter and ETT. No pneumothorax.   2.  NGT tip is in the distal esophagus. Recommend advancing 10 cm.   3.  Lungs are clear.            Central Line Placement Procedure Note -inserted with the assistance of a third-year medical student  Indication: centrally administered medications    Consent: Unable to be obtained due to the emergent nature of this procedure.    Procedure: The patient was positioned appropriately and the skin over the right internal jugular vein was prepped with chlorhexidine and draped in a sterile fashion. Local anesthesia was not performed due to the emergent nature of this procedure.  A large bore needle was used to identify the vein.  A guide wire was then inserted into the vein through the needle. A triple lumen catheter was then inserted into the vessel over the guide wire using the Seldinger technique.  All ports showed good, free flowing blood return and were flushed with saline solution.  The catheter was then securely fastened to the skin with sutures and covered with a sterile dressing.  A post procedure  X-ray was ordered and showed good line position.    The patient tolerated the procedure well.    Complications: None      COURSE & MEDICAL DECISION MAKING  I have reviewed any medical record information, laboratory studies and radiographic results as noted above.    Encounter Summary: This is a 50 y.o. male with an overdose on multiple agents including antipsychotic, SSRI, antihistamine and antihypertensive agent.  Was somnolent at the outside facility requiring intubation.  He arrives intubated and hypotensive, blood pressure dropped as low as 60 systolic during transport required frequent dosing of epinephrine pushes.  Immediately upon his arrival a central line was placed for Levophed infusion.  He showed no indication of localizing neurologic deficit, he would withdrawal all fours extremities symmetrically to painful stimulus, he did require sedation with propofol as he was displaying some purposeful movement as well.  Blood work including acetaminophen and salicylate level will be obtained, EKG, chest x-ray to confirm line and tube location, he will be watched quite closely ultimately require hospitalization to the intensive care unit ------ work-up is unrevealing, negative salicylates and acetaminophen, discussed the case with the hospitalist and intensivist, admitted in critical condition    CRITICAL CARE  The very real possibilty of a deterioration of this patient's condition required the highest level of my preparedness for sudden, emergent intervention.  I provided critical care services, which included medication orders, frequent reevaluations of the patient's condition and response to treatment, ordering and reviewing test results, and discussing the case with various consultants.  The critical care time associated with the care of the patient was 39 minutes. Review chart for interventions. This time is exclusive of any other billable procedures.         DISPOSITION: Admit in critical  condition      FINAL IMPRESSION  1. Intentional drug overdose, initial encounter ([facility])    2. Eleonora coma scale total score 3-8, in the field (EMT or ambulance) ([facility])           This dictation was created using voice recognition software. The accuracy of the dictation is limited to the abilities of the software. I expect there may be some errors of grammar and possibly content. The nursing notes were reviewed and certain aspects of this information were incorporated into this note.    Electronically signed by: Osmar Velazco M.D., 2/17/2021 5:39 PM

## 2021-02-18 NOTE — DISCHARGE PLANNING
Anticipated Discharge Disposition: TBD    Action: informed by [facility] that pt needs to be on legal hold due to SI and suicide attempt. Legal hold paperwork signed and completed by Dr. Quezada. Faxed copy to legal hold [facility] [id number]    Barriers to Discharge:   Legal hold    Plan: TBD

## 2021-02-18 NOTE — PROGRESS NOTES
Received report from ED RN on pt in Red 5. Transported vented pt with Scout RN and RT via gurney, transport monitor on Propofol and Levophed gtt without incident. At 2010 admitted pt into room 635 and pt placed on bedside monitor. CHG bath completed. 2 RN skin check completed with Scout RN. VSS. Monitoring continues.

## 2021-02-18 NOTE — ED NOTES
"Med rec PARTIAL per medication bottles in red/black bag in Pt belongings at bedside. Pt unable to participate in interview. Unable to verify times of last doses with Pt. Medication bottles reviewed and returned to Pt belongings.  Unable to assess allergies with Pt.  Per medication list present in bag, Pt was apparently directed to \"taper and discontinue\" his mirtazapine. Unable to verify when/if Pt began tapering this medication.  Unable to verify directions for Pt's Combivent Respimat as label not present with inhaler; unable to verify directions, or whether Pt receives other medications not present at bedside, with Pt's pharmacy [facility] () as the pharmacy is closed at this time. Pharmacy reopens tomorrow 2/18 at 0930.  "

## 2021-02-18 NOTE — CONSULTS
Critical Care Consultation    Date of consult: 2/17/2021    Referring Physician  Carlos Wayne M.D.    Reason for Consultation  Overdose    History of Presenting Illness  50 y.o. male who presented 2/17/2021 with overdose.  Pt took intentional overdose of his own medications.  Pt brought to OSH for evaluation.  At OSH pt became progressively more somnolent and was intubated to protect his airway.  Pt transferred to [facility] for evaluation and treatment of overdose.    Code Status  Full Code    Review of Systems  Review of Systems   Unable to perform ROS: Intubated       Past Medical History   has a past medical history of ADD (attention deficit disorder), ASTHMA, Bipolar 2 disorder (HCC), Deafness, Hypertension, and Psychiatric disorder.    Surgical History   has a past surgical history that includes other abdominal surgery.    Family History  family history is not on file.    Social History   reports that he has been smoking cigarettes. He has been smoking about 2.00 packs per day. He uses smokeless tobacco. He reports current alcohol use. He reports current drug use.    Medications  Home Medications     Reviewed by Vee Ramirez (Pharmacy Tech) on 02/17/21 at 1914  Med List Status: Partial   Medication Last Dose Status   atorvastatin (LIPITOR) 20 MG Tab unknown Active   escitalopram (LEXAPRO) 10 MG Tab unknown Active   hydrocodone-acetaminophen (NORCO) 5-325 MG Tab per tablet  Active   hydrOXYzine pamoate (VISTARIL) 50 MG Cap unknown Active   ibuprofen (MOTRIN) 600 MG Tab  Active   ipratropium-albuterol (COMBIVENT RESPIMAT)  MCG/ACT Aero Soln unknown Active   lorazepam (ATIVAN) 0.5 MG Tab  Active   losartan (COZAAR) 100 MG Tab unknown Active   mirtazapine (REMERON) 15 MG Tab unknown Active   mupirocin (BACTROBAN) 2 % Ointment  Active   olanzapine (ZYPREXA) 10 MG tablet unknown Active   oxcarbazepine (TRILEPTAL) 600 MG tablet unknown Active   sulfamethoxazole-trimethoprim (BACTRIM DS) 800-160 MG  tablet  Active   therapeutic multivitamin-minerals (THERAGRAN-M) Tab unknown Active   topiramate (TOPAMAX) 25 MG Tab  Active   trazodone (DESYREL) 100 MG Tab  Active   ziprasidone (GEODON) 60 MG Cap  Active              Current Facility-Administered Medications   Medication Dose Route Frequency Provider Last Rate Last Admin   • NS infusion 1,000 mL  1,000 mL Intravenous Once Osmar Velazco M.D.       • propofol (DIPRIVAN) injection  0-80 mcg/kg/min Intravenous Continuous Osmar Velazco M.D. 4.1 mL/hr at 02/17/21 1700 10 mcg/kg/min at 02/17/21 1700   • senna-docusate (PERICOLACE or SENOKOT S) 8.6-50 MG per tablet 2 tablet  2 tablet Oral BID Carlos Wayne M.D.        And   • polyethylene glycol/lytes (MIRALAX) PACKET 1 Packet  1 Packet Oral QDAY PRN Carlos Wayne M.D.        And   • magnesium hydroxide (MILK OF MAGNESIA) suspension 30 mL  30 mL Oral QDAY PRN Carlos Wayne M.D.        And   • bisacodyl (DULCOLAX) suppository 10 mg  10 mg Rectal QDAY PRN Carlos Wayne M.D.       • [START ON 2/18/2021] enoxaparin (LOVENOX) inj 40 mg  40 mg Subcutaneous DAILY Carlos Wayne M.D.       • NS infusion   Intravenous Continuous Carlos Wayne M.D.         Current Outpatient Medications   Medication Sig Dispense Refill   • hydrOXYzine pamoate (VISTARIL) 50 MG Cap Take  mg by mouth 4 times a day as needed for Anxiety or Other (Insomnia). 1 to 2 capsules = 50 to 100 mg.     • atorvastatin (LIPITOR) 20 MG Tab Take 20 mg by mouth every day.     • escitalopram (LEXAPRO) 10 MG Tab Take 10 mg by mouth every morning.     • ipratropium-albuterol (COMBIVENT RESPIMAT)  MCG/ACT Aero Soln      • losartan (COZAAR) 100 MG Tab Take 100 mg by mouth every day.     • olanzapine (ZYPREXA) 10 MG tablet Take 10 mg by mouth every bedtime.     • therapeutic multivitamin-minerals (THERAGRAN-M) Tab Take 1 tablet by mouth every day.     • mirtazapine (REMERON) 15 MG Tab Take 7.5 mg by mouth see  administration instructions. 1/2 tablet = 7.5 mg. Take 1/2 tablet by mouth every night for 1 week, then stop medication.     • hydrocodone-acetaminophen (NORCO) 5-325 MG Tab per tablet Take 1-2 Tabs by mouth every 6 hours as needed. 12 Tab 0   • ibuprofen (MOTRIN) 600 MG Tab Take 1 Tab by mouth every 6 hours as needed. 30 Tab 0   • lorazepam (ATIVAN) 0.5 MG Tab Take 1 Tab by mouth every four hours as needed for Anxiety. 30 Tab 0   • sulfamethoxazole-trimethoprim (BACTRIM DS) 800-160 MG tablet Take 1 Tab by mouth 2 times a day. 20 Tab 0   • mupirocin (BACTROBAN) 2 % Ointment Apply 1 Application to affected area(s) 2 times a day. 1 Tube 2   • oxcarbazepine (TRILEPTAL) 600 MG tablet Take 1,200 mg by mouth 2 times a day. 2 tablets = 1200 mg.         Allergies  Allergies   Allergen Reactions   • Orange        Vital Signs last 24 hours  Temp:  [36.5 °C (97.7 °F)] 36.5 °C (97.7 °F)  Pulse:  [69-86] 69  Resp:  [16-27] 22  BP: ()/(49-83) 110/68  SpO2:  [91 %-100 %] 100 %    Physical Exam  Physical Exam  Constitutional:       Appearance: He is ill-appearing.   HENT:      Head: Normocephalic and atraumatic.      Nose: Nose normal.      Mouth/Throat:      Mouth: Mucous membranes are dry.      Pharynx: Oropharynx is clear.   Eyes:      Conjunctiva/sclera: Conjunctivae normal.      Pupils: Pupils are equal, round, and reactive to light.   Cardiovascular:      Rate and Rhythm: Normal rate and regular rhythm.      Pulses: Normal pulses.      Heart sounds: Normal heart sounds.   Pulmonary:      Breath sounds: Normal breath sounds.   Abdominal:      General: Abdomen is flat. There is no distension.      Palpations: Abdomen is soft.      Tenderness: There is no abdominal tenderness.   Musculoskeletal:         General: Normal range of motion.   Skin:     General: Skin is warm and dry.      Capillary Refill: Capillary refill takes less than 2 seconds.   Neurological:      Comments: Intubated and sedated.         Fluids  No intake  or output data in the 24 hours ending 02/17/21 1929    Laboratory  Recent Results (from the past 48 hour(s))   Blood Alcohol    Collection Time: 02/17/21  4:50 PM   Result Value Ref Range    Diagnostic Alcohol <10.1 0.0 - 10.0 mg/dL   CBC WITH DIFFERENTIAL    Collection Time: 02/17/21  4:50 PM   Result Value Ref Range    WBC 15.6 (H) 4.8 - 10.8 K/uL    RBC 4.07 (L) 4.70 - 6.10 M/uL    Hemoglobin 12.3 (L) 14.0 - 18.0 g/dL    Hematocrit 37.2 (L) 42.0 - 52.0 %    MCV 91.4 81.4 - 97.8 fL    MCH 30.2 27.0 - 33.0 pg    MCHC 33.1 (L) 33.7 - 35.3 g/dL    RDW 46.3 35.9 - 50.0 fL    Platelet Count 232 164 - 446 K/uL    MPV 8.6 (L) 9.0 - 12.9 fL    Neutrophils-Polys 83.20 (H) 44.00 - 72.00 %    Lymphocytes 11.30 (L) 22.00 - 41.00 %    Monocytes 4.10 0.00 - 13.40 %    Eosinophils 0.40 0.00 - 6.90 %    Basophils 0.50 0.00 - 1.80 %    Immature Granulocytes 0.50 0.00 - 0.90 %    Nucleated RBC 0.00 /100 WBC    Neutrophils (Absolute) 12.98 (H) 1.82 - 7.42 K/uL    Lymphs (Absolute) 1.77 1.00 - 4.80 K/uL    Monos (Absolute) 0.64 0.00 - 0.85 K/uL    Eos (Absolute) 0.06 0.00 - 0.51 K/uL    Baso (Absolute) 0.08 0.00 - 0.12 K/uL    Immature Granulocytes (abs) 0.08 0.00 - 0.11 K/uL    NRBC (Absolute) 0.00 K/uL   COMP METABOLIC PANEL    Collection Time: 02/17/21  4:50 PM   Result Value Ref Range    Sodium 135 135 - 145 mmol/L    Potassium 4.4 3.6 - 5.5 mmol/L    Chloride 105 96 - 112 mmol/L    Co2 19 (L) 20 - 33 mmol/L    Anion Gap 11.0 7.0 - 16.0    Glucose 118 (H) 65 - 99 mg/dL    Bun 18 8 - 22 mg/dL    Creatinine 1.23 0.50 - 1.40 mg/dL    Calcium 8.4 (L) 8.5 - 10.5 mg/dL    AST(SGOT) 15 12 - 45 U/L    ALT(SGPT) 21 2 - 50 U/L    Alkaline Phosphatase 75 30 - 99 U/L    Total Bilirubin 0.3 0.1 - 1.5 mg/dL    Albumin 3.8 3.2 - 4.9 g/dL    Total Protein 5.8 (L) 6.0 - 8.2 g/dL    Globulin 2.0 1.9 - 3.5 g/dL    A-G Ratio 1.9 g/dL   Acetaminophen Level    Collection Time: 02/17/21  4:50 PM   Result Value Ref Range    Acetaminophen -Tylenol <5 (L)  10 - 30 ug/mL   SALICYLATE LEVEL    Collection Time: 21  4:50 PM   Result Value Ref Range    Salicylates, Quant. <1 (L) 15 - 25 mg/dL   ESTIMATED GFR    Collection Time: 21  4:50 PM   Result Value Ref Range    GFR If African American >60 >60 mL/min/1.73 m 2    GFR If Non African American >60 >60 mL/min/1.73 m 2   COV-2, FLU A/B, AND RSV BY PCR (2-4 HOURS CEPHEID): Collect NP swab in VTM    Collection Time: 21  5:30 PM    Specimen: Respirate   Result Value Ref Range    Influenza virus A RNA Negative Negative    Influenza virus B, PCR Negative Negative    RSV, PCR Negative Negative    SARS-CoV-2 by PCR NotDetected     SARS-CoV-2 Source NP Swab    ISTAT ARTERIAL BLOOD GAS    Collection Time: 21  6:13 PM   Result Value Ref Range    Ph 7.302 (L) 7.400 - 7.500    Pco2 42.5 (H) 26.0 - 37.0 mmHg    Po2 116 (H) 64 - 87 mmHg    Tco2 22 20 - 33 mmol/L    S02 98 93 - 99 %    Hco3 21.0 17.0 - 25.0 mmol/L    BE -5 (L) -4 - 3 mmol/L    Body Temp 97.5 F degrees    O2 Therapy 40 %    iPF Ratio 290     Ph Temp [place] 7.310 (L) 7.400 - 7.500    Pco2 Temp Co 41.4 (H) 26.0 - 37.0 mmHg    Po2 Temp Cor 112 (H) 64 - 87 mmHg    Specimen Arterial     Action Range Triggered NO     Inst. Qualified Patient YES    EKG    Collection Time: 21  6:28 PM   Result Value Ref Range    Report       [facility] Emergency Dept.    Test Date:  2021  Pt Name:    CURTIS PICKERING                  Department:   MRN:        [id number]                      Room:       [facility]  Gender:     Male                         Technician: [id number]  :        1970                   Requested By:TREY TOSCANO  Order #:    [id number]                    Reading MD:    Measurements  Intervals                                Axis  Rate:       80                           P:          77  WV:         200                          QRS:        65  QRSD:       109                          T:          34  QT:         403  QTc:         465    Interpretive Statements  Sinus rhythm  Anterior infarct, old  Compared to ECG 05/02/2008 17:01:04  Myocardial infarct finding now present         Imaging  DX-CHEST-PORTABLE (1 VIEW)   Final Result      1.  Well-positioned right IJ central catheter and ETT. No pneumothorax.   2.  NGT tip is in the distal esophagus. Recommend advancing 10 cm.   3.  Lungs are clear.          Assessment/Plan  * Drug overdose- (present on admission)  Assessment & Plan  - Overdosed on a panacea of polypharmacy.    - Support pt's hemodynamics with ventilator and vasopressors  - Admit to ICU   - Cont vasopressor  - Keep intubated and sedated overnight  - Monitor telemetry  - Pt will need psychiatric evaluation for overdose          Discussed patient condition and risk of morbidity and/or mortality with RN.    The patient remains critically ill.  Critical care time = 73 minutes in directly providing and coordinating critical care and extensive data review.  No time overlap and excludes procedures.

## 2021-02-18 NOTE — ED TRIAGE NOTES
"Zev Mckinney  Chief Complaint   Patient presents with   • Drug Overdose     polypharmacy drug OD around 1200 today, found by mother at 1230      Pt BIB [facility]. Pt took about 40 different pills around 1200 today - Escitalopram, Vistaril, Oxcarbazepine, Atorvastatin, Losartan, Mirtazapine, Olanzapine. Pt mother found him around 1230 and took him to [facility]. Pt arrived at their ER, confused and combative with GCS 6 - given 2 mg Ativan, 8 mg Versed. Pt was then intubated at transferring facility. During flight transfer, pt BP dropped to 60-80s systolic, given total  40 mcg IVP Epinephrine. BP 80s systolic on arrival to ER.     Pt also received 3L NS and total of 200 mcg Fentanyl; 450 mg Ketamine; 150 mg Rocuronium for sedation during transport.    BP (!) 88/62   Pulse 86   Temp 36.5 °C (97.7 °F) (Temporal)   Resp (!) 27   Ht 1.651 m (5' 5\")   Wt 68 kg (150 lb)   SpO2 100%   "

## 2021-02-18 NOTE — FLOWSHEET NOTE
02/18/21 0815   Weaning Parameters   RR (bpm) 19   $ FVC / Vital Capacity (liters)  1.5   NIF (cm H2O)  -35   Rapid Shallow Breathing Index (RR/VT) 28   Spontaneous VE 15   Spontaneous

## 2021-02-18 NOTE — RESPIRATORY CARE
Respiratory Therapy Update        pt arrived intubated 7.5 ETT at 24cm; placement confirmed; placed on vent. See flowsheet for vent settings.

## 2021-02-18 NOTE — DISCHARGE PLANNING
Medical Social Work    Referral: Acute Medical Patient    Intervention: SW responded to RED 05. Pt is a 50 year old male [facility]IB [facility] transferred from [facility] for drug overdose. Pt arrives intubated to Red 05. Pt's name is Zev Mckinney ( 1970). Per EMS, pt was found down around 1230 by his mother who took him to [facility]. Pt was confused and combative and was given medications and intubated prior to transfer to [facility]. Pt's emergency contact listed on Facesheet is Nimo Mckinney (mother) [phone].     Plan: SW will remain available as needed.

## 2021-02-18 NOTE — ASSESSMENT & PLAN NOTE
Patient transferred [facility] intubated due to drug overdose  Critical care consulted, ICU admission  Patient successfully extubated, monitor

## 2021-02-19 LAB
BASOPHILS # BLD AUTO: 0.7 % (ref 0–1.8)
BASOPHILS # BLD: 0.07 K/UL (ref 0–0.12)
EOSINOPHIL # BLD AUTO: 0.13 K/UL (ref 0–0.51)
EOSINOPHIL NFR BLD: 1.3 % (ref 0–6.9)
ERYTHROCYTE [DISTWIDTH] IN BLOOD BY AUTOMATED COUNT: 46 FL (ref 35.9–50)
HCT VFR BLD AUTO: 32.1 % (ref 42–52)
HGB BLD-MCNC: 10.7 G/DL (ref 14–18)
IMM GRANULOCYTES # BLD AUTO: 0.04 K/UL (ref 0–0.11)
IMM GRANULOCYTES NFR BLD AUTO: 0.4 % (ref 0–0.9)
LYMPHOCYTES # BLD AUTO: 2.48 K/UL (ref 1–4.8)
LYMPHOCYTES NFR BLD: 25 % (ref 22–41)
MAGNESIUM SERPL-MCNC: 1.6 MG/DL (ref 1.5–2.5)
MCH RBC QN AUTO: 30.3 PG (ref 27–33)
MCHC RBC AUTO-ENTMCNC: 33.3 G/DL (ref 33.7–35.3)
MCV RBC AUTO: 90.9 FL (ref 81.4–97.8)
MONOCYTES # BLD AUTO: 0.95 K/UL (ref 0–0.85)
MONOCYTES NFR BLD AUTO: 9.6 % (ref 0–13.4)
NEUTROPHILS # BLD AUTO: 6.25 K/UL (ref 1.82–7.42)
NEUTROPHILS NFR BLD: 63 % (ref 44–72)
NRBC # BLD AUTO: 0 K/UL
NRBC BLD-RTO: 0 /100 WBC
PHOSPHATE SERPL-MCNC: 2.7 MG/DL (ref 2.5–4.5)
PLATELET # BLD AUTO: 208 K/UL (ref 164–446)
PMV BLD AUTO: 8.8 FL (ref 9–12.9)
RBC # BLD AUTO: 3.53 M/UL (ref 4.7–6.1)
WBC # BLD AUTO: 9.9 K/UL (ref 4.8–10.8)

## 2021-02-19 PROCEDURE — 770001 HCHG ROOM/CARE - MED/SURG/GYN PRIV*

## 2021-02-19 PROCEDURE — A9270 NON-COVERED ITEM OR SERVICE: HCPCS | Performed by: INTERNAL MEDICINE

## 2021-02-19 PROCEDURE — 99233 SBSQ HOSP IP/OBS HIGH 50: CPT | Performed by: PSYCHIATRY & NEUROLOGY

## 2021-02-19 PROCEDURE — 85025 COMPLETE CBC W/AUTO DIFF WBC: CPT

## 2021-02-19 PROCEDURE — 83735 ASSAY OF MAGNESIUM: CPT

## 2021-02-19 PROCEDURE — 700111 HCHG RX REV CODE 636 W/ 250 OVERRIDE (IP): Performed by: INTERNAL MEDICINE

## 2021-02-19 PROCEDURE — 99231 SBSQ HOSP IP/OBS SF/LOW 25: CPT | Performed by: INTERNAL MEDICINE

## 2021-02-19 PROCEDURE — 700102 HCHG RX REV CODE 250 W/ 637 OVERRIDE(OP): Performed by: HOSPITALIST

## 2021-02-19 PROCEDURE — 99232 SBSQ HOSP IP/OBS MODERATE 35: CPT | Performed by: HOSPITALIST

## 2021-02-19 PROCEDURE — 700102 HCHG RX REV CODE 250 W/ 637 OVERRIDE(OP): Performed by: INTERNAL MEDICINE

## 2021-02-19 PROCEDURE — A9270 NON-COVERED ITEM OR SERVICE: HCPCS | Performed by: HOSPITALIST

## 2021-02-19 PROCEDURE — 84100 ASSAY OF PHOSPHORUS: CPT

## 2021-02-19 RX ORDER — LOSARTAN POTASSIUM 50 MG/1
50 TABLET ORAL DAILY
Status: DISCONTINUED | OUTPATIENT
Start: 2021-02-19 | End: 2021-02-20

## 2021-02-19 RX ORDER — ATORVASTATIN CALCIUM 20 MG/1
20 TABLET, FILM COATED ORAL DAILY
Status: DISCONTINUED | OUTPATIENT
Start: 2021-02-19 | End: 2021-02-24 | Stop reason: HOSPADM

## 2021-02-19 RX ORDER — MAGNESIUM SULFATE HEPTAHYDRATE 40 MG/ML
2 INJECTION, SOLUTION INTRAVENOUS ONCE
Status: COMPLETED | OUTPATIENT
Start: 2021-02-19 | End: 2021-02-19

## 2021-02-19 RX ADMIN — FAMOTIDINE 20 MG: 20 TABLET ORAL at 05:09

## 2021-02-19 RX ADMIN — MAGNESIUM SULFATE 2 G: 2 INJECTION INTRAVENOUS at 07:33

## 2021-02-19 RX ADMIN — FAMOTIDINE 20 MG: 20 TABLET ORAL at 17:08

## 2021-02-19 RX ADMIN — DOCUSATE SODIUM 50 MG AND SENNOSIDES 8.6 MG 2 TABLET: 8.6; 5 TABLET, FILM COATED ORAL at 05:08

## 2021-02-19 RX ADMIN — ACETAMINOPHEN 650 MG: 325 TABLET ORAL at 00:06

## 2021-02-19 RX ADMIN — ENOXAPARIN SODIUM 40 MG: 40 INJECTION SUBCUTANEOUS at 05:09

## 2021-02-19 RX ADMIN — LOSARTAN POTASSIUM 50 MG: 50 TABLET, FILM COATED ORAL at 08:30

## 2021-02-19 RX ADMIN — LABETALOL HYDROCHLORIDE 10 MG: 5 INJECTION, SOLUTION INTRAVENOUS at 03:06

## 2021-02-19 RX ADMIN — ATORVASTATIN CALCIUM 20 MG: 20 TABLET, FILM COATED ORAL at 08:30

## 2021-02-19 ASSESSMENT — ENCOUNTER SYMPTOMS
NERVOUS/ANXIOUS: 1
GASTROINTESTINAL NEGATIVE: 1
CONSTITUTIONAL NEGATIVE: 1
HALLUCINATIONS: 1
DEPRESSION: 1
SHORTNESS OF BREATH: 0
NEUROLOGICAL NEGATIVE: 1
CARDIOVASCULAR NEGATIVE: 1
SORE THROAT: 0
RESPIRATORY NEGATIVE: 1
TREMORS: 0
MUSCULOSKELETAL NEGATIVE: 1
SORE THROAT: 1
ABDOMINAL PAIN: 0
HEADACHES: 1
VOMITING: 0
EYES NEGATIVE: 1
NAUSEA: 0

## 2021-02-19 ASSESSMENT — PATIENT HEALTH QUESTIONNAIRE - PHQ9
4. FEELING TIRED OR HAVING LITTLE ENERGY: SEVERAL DAYS
SUM OF ALL RESPONSES TO PHQ QUESTIONS 1-9: 9
1. LITTLE INTEREST OR PLEASURE IN DOING THINGS: SEVERAL DAYS
3. TROUBLE FALLING OR STAYING ASLEEP OR SLEEPING TOO MUCH: SEVERAL DAYS
SUM OF ALL RESPONSES TO PHQ9 QUESTIONS 1 AND 2: 2
8. MOVING OR SPEAKING SO SLOWLY THAT OTHER PEOPLE COULD HAVE NOTICED. OR THE OPPOSITE, BEING SO FIGETY OR RESTLESS THAT YOU HAVE BEEN MOVING AROUND A LOT MORE THAN USUAL: SEVERAL DAYS
2. FEELING DOWN, DEPRESSED, IRRITABLE, OR HOPELESS: SEVERAL DAYS
9. THOUGHTS THAT YOU WOULD BE BETTER OFF DEAD, OR OF HURTING YOURSELF: SEVERAL DAYS
6. FEELING BAD ABOUT YOURSELF - OR THAT YOU ARE A FAILURE OR HAVE LET YOURSELF OR YOUR FAMILY DOWN: SEVERAL DAYS
7. TROUBLE CONCENTRATING ON THINGS, SUCH AS READING THE NEWSPAPER OR WATCHING TELEVISION: SEVERAL DAYS
5. POOR APPETITE OR OVEREATING: SEVERAL DAYS

## 2021-02-19 ASSESSMENT — COGNITIVE AND FUNCTIONAL STATUS - GENERAL
SUGGESTED CMS G CODE MODIFIER DAILY ACTIVITY: CJ
DRESSING REGULAR LOWER BODY CLOTHING: A LITTLE
MOBILITY SCORE: 23
DAILY ACTIVITIY SCORE: 22
CLIMB 3 TO 5 STEPS WITH RAILING: A LITTLE
DRESSING REGULAR UPPER BODY CLOTHING: A LITTLE
SUGGESTED CMS G CODE MODIFIER MOBILITY: CI

## 2021-02-19 ASSESSMENT — PAIN DESCRIPTION - PAIN TYPE
TYPE: ACUTE PAIN
TYPE: CHRONIC PAIN
TYPE: ACUTE PAIN

## 2021-02-19 ASSESSMENT — FIBROSIS 4 INDEX: FIB4 SCORE: 0.76

## 2021-02-19 NOTE — ASSESSMENT & PLAN NOTE
Intubated 2/17 after apparent overdose  RT protocol/ventilator bundle  SAT/SBT as tolerated  Extubation trial today, patient improving

## 2021-02-19 NOTE — PROGRESS NOTES
RCC    Patient seen and examined  EHR reviewed  Seen on ICU team rounds  Legal hold placed 2/18 psychiatry evaluating    A&O x3  Follows well  Admits to taking extra pills to eliminate pain/be gone  Patient also describes still hearing voices  Hemodynamics normalized  Lungs are clear and voices good post extubation yesterday    Status post polypharmacy OD  Acute hypoxic respiratory failure resolved  Hypotension resolved  Altered LOC resolved  Suicidal ideation  Schizophrenia    Continue legal hold  Psychiatry evaluating  Sitter  Begin resuming home medicines  Usual prophylaxis  Mobilize with sitter  May be able to move out of ICU today, defer to hospitalist and psychiatry    D/w Hospitalist, RN, RT, Charge, Pharmacy and patient  RCC signing off, case reviewed with Dr. Krishnamurthy, internal medicine assuming care
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
## 2021-02-19 NOTE — PROGRESS NOTES
Hospital Medicine Daily Progress Note    Date of Service  2/18/2021    Chief Complaint  50 y.o. male admitted 2/17/2021 with history of schizophrenia, ADHD admitted from an outlying hospital in [facility] secondary to drug overdose and suicidal attempt.    Hospital Course  50-year-old male with a history of schizophrenia, otherwise unspecified mental disorder admitted with probably medication overdose and suicidal attempt.  The patient initially found hemodynamically unstable and required vasopressors and intubation to protect airway.  The patient admitted to ICU with the critical care team.  Psychiatry consulted.    Interval Problem Update  Patient seen and examined today. ICU Care  Care and plan discussed in IDT/Hot rounds.  Lines and assistive devices reviewed.    Patient tolerating treatment and therapies.  All Data, Medication data reviewed.  Case discussed with nursing as available.  Plan of Care reviewed with patient and notified of changes.  2/18 patient successfully extubated, admitted to psychiatry team that he overdosed on large amount of pills, stating that he took half bottle of everything.  Reportedly the patient is chronically on escitalopram, Vistaril, oxcarbazepine, atorvastatin, losartan, mirtazapine, olanzapine.  Today after extubation patient is alert and oriented 2-3, following commands, sinus rhythm in the 70s, blood pressure in the 80s to 110s, norepinephrine is titrated off, urine output is adequate, afebrile, white cell count 13.2.  The patient is placed on legal hold  Consultants/Specialty  Pulmonary critical care  Psychiatry  Code Status  Full Code    Disposition  After stabilization will need to be transferred to a inpatient psychiatric hospital    Review of Systems  Review of Systems   Unable to perform ROS: Mental acuity        Physical Exam  Temp:  [36.4 °C (97.5 °F)-36.6 °C (97.9 °F)] 36.5 °C (97.7 °F)  Pulse:  [] 95  Resp:  [10-24] 18  BP: ()/() 159/105  SpO2:   [93 %-100 %] 98 %    Physical Exam  Vitals and nursing note reviewed.   Constitutional:       Appearance: He is well-developed. He is ill-appearing. He is not toxic-appearing or diaphoretic.      Interventions: Nasal cannula in place.      Comments: Pt seen and examined.   HENT:      Head: Normocephalic and atraumatic.   Eyes:      Pupils: Pupils are equal, round, and reactive to light.   Cardiovascular:      Rate and Rhythm: Normal rate.      Heart sounds: Normal heart sounds.   Pulmonary:      Effort: Pulmonary effort is normal.      Breath sounds: Normal breath sounds.   Abdominal:      General: Bowel sounds are normal.      Palpations: Abdomen is soft.   Genitourinary:     Penis: Normal.    Musculoskeletal:         General: Normal range of motion.      Cervical back: Normal range of motion and neck supple.   Skin:     General: Skin is warm and dry.   Neurological:      General: No focal deficit present.      Mental Status: He is lethargic.   Psychiatric:         Attention and Perception: He perceives auditory hallucinations.         Mood and Affect: Mood is anxious.         Speech: Speech is slurred.         Behavior: Behavior is slowed.         Thought Content: Thought content is paranoid. Thought content does not include homicidal ideation.         Cognition and Memory: Cognition is impaired.         Judgment: Judgment is impulsive.         Fluids    Intake/Output Summary (Last 24 hours) at 2/18/2021 1716  Last data filed at 2/18/2021 1200  Gross per 24 hour   Intake 2342.63 ml   Output 1515 ml   Net 827.63 ml       Laboratory  Recent Labs     02/17/21  1650 02/18/21  0300   WBC 15.6* 13.2*   RBC 4.07* 3.78*   HEMOGLOBIN 12.3* 11.4*   HEMATOCRIT 37.2* 34.4*   MCV 91.4 91.0   MCH 30.2 30.2   MCHC 33.1* 33.1*   RDW 46.3 46.7   PLATELETCT 232 222   MPV 8.6* 8.5*     Recent Labs     02/17/21  1650 02/18/21  0300   SODIUM 135 136   POTASSIUM 4.4 4.1   CHLORIDE 105 107   CO2 19* 21   GLUCOSE 118* 143*   BUN 18 14    CREATININE 1.23 0.84   CALCIUM 8.4* 8.2*                   Imaging  DX-CHEST-PORTABLE (1 VIEW)   Final Result      1.  Well-positioned right IJ central catheter and ETT. No pneumothorax.   2.  NGT tip is in the distal esophagus. Recommend advancing 10 cm.   3.  Lungs are clear.           Assessment/Plan  * Drug overdose- (present on admission)  Assessment & Plan  Patient report by EDP and  patient had intentionally overdosed  Suicidal behavior  Legal hold initiated, psychiatry consulting  Monitor for possible effects from polydrug overdose    Suicidal behavior with attempted self-injury (HCC)- (present on admission)  Assessment & Plan  Legal hold, psychiatry consulting    Endotracheally intubated- (present on admission)  Assessment & Plan  Patient transferred [facility] intubated due to drug overdose  Critical care consulted, ICU admission  Patient successfully extubated, monitor    Acute renal failure (ARF) ([facility])- (present on admission)  Assessment & Plan  Patient's creatinine 1.23 unclear on baseline  Monitor urine output  IV fluids  Avoid nephrotoxins    Schizophrenia ([facility])  Assessment & Plan  Per psychiatry evaluation, reinitiate medication regimen once appropriate    Leukocytosis- (present on admission)  Assessment & Plan  Patient WBC 15.6  Concern for aspiration pneumonia  Monitor clinically,     Plan  Monitor electrolytes, monitor respiratory status  Psychiatry evaluation appreciated  Legal hold initiated  Monitor for side effects from overdose  Follow labs closely  Patient voiced concern of possible GI bleed recurrence, currently no evidence, placed on Pepcid  See orders  Medically complex high risk patient    VTE prophylaxis: Lovenox  I have performed a physical exam and reviewed and updated ROS and Plan today . In review of yesterday's note , there are no changes except as documented above.        Please note that this dictation was created using voice recognition software. I  have made every reasonable attempt to correct obvious errors, but I expect that there are errors of grammar and possibly context that I did not discover before finalizing the note.    This patient was seen under COVID 19 pandemic disaster response conditions.  During a disaster, the provisions of care is subject to the Crisis Standard of Care

## 2021-02-19 NOTE — PROGRESS NOTES
Hospital Medicine Daily Progress Note    Date of Service  2/19/2021    Chief Complaint  50 y.o. male admitted 2/17/2021 with history of schizophrenia, ADHD admitted from an outlying hospital in [facility] secondary to drug overdose and suicidal attempt.    Hospital Course  50-year-old male with a history of schizophrenia, otherwise unspecified mental disorder admitted with probably medication overdose and suicidal attempt.  The patient initially found hemodynamically unstable and required vasopressors and intubation to protect airway.  The patient admitted to ICU with the critical care team.  Psychiatry consulted.    Interval Problem Update  Patient seen and examined today. ICU Care  Care and plan discussed in IDT/Hot rounds.  Lines and assistive devices reviewed.    Patient tolerating treatment and therapies.  All Data, Medication data reviewed.  Case discussed with nursing as available.  Plan of Care reviewed with patient and notified of changes.  2/18 patient successfully extubated, admitted to psychiatry team that he overdosed on large amount of pills, stating that he took half bottle of everything.  Reportedly the patient is chronically on escitalopram, Vistaril, oxcarbazepine, atorvastatin, losartan, mirtazapine, olanzapine.  Today after extubation patient is alert and oriented 2-3, following commands, sinus rhythm in the 70s, blood pressure in the 80s to 110s, norepinephrine is titrated off, urine output is adequate, afebrile, white cell count 13.2.  The patient is placed on legal hold  2/19 the patient feels better, he continues to worry about the thoughts in his head's, he states there are Rushing, flighting, he is eating well, he denies current pain, slight sore throat, psychiatry follow-up pending, continue legal hold  Consultants/Specialty  Pulmonary critical care  Psychiatry  Code Status  Full Code    Disposition  After stabilization will need to be transferred to a inpatient psychiatric  hospital    Review of Systems  Review of Systems   Unable to perform ROS: Mental acuity   Constitutional: Negative.    HENT: Positive for sore throat.    Eyes: Negative.    Respiratory: Negative.    Cardiovascular: Negative.    Gastrointestinal: Negative.    Genitourinary: Negative.    Musculoskeletal: Negative.    Skin: Negative.    Neurological: Negative.    Endo/Heme/Allergies: Negative.    Psychiatric/Behavioral: The patient is nervous/anxious.         Hearing voices   All other systems reviewed and are negative.       Physical Exam  Temp:  [36.5 °C (97.7 °F)-37.6 °C (99.6 °F)] 37.6 °C (99.6 °F)  Pulse:  [] 69  Resp:  [12-22] 12  BP: (105-162)/() 150/99  SpO2:  [90 %-99 %] 93 %    Physical Exam  Vitals and nursing note reviewed.   Constitutional:       Appearance: He is well-developed. He is ill-appearing. He is not toxic-appearing or diaphoretic.      Interventions: Nasal cannula in place.      Comments: Pt seen and examined.   HENT:      Head: Normocephalic and atraumatic.   Eyes:      Pupils: Pupils are equal, round, and reactive to light.   Cardiovascular:      Rate and Rhythm: Normal rate.      Heart sounds: Normal heart sounds.   Pulmonary:      Effort: Pulmonary effort is normal.      Breath sounds: Normal breath sounds.   Abdominal:      General: Bowel sounds are normal.      Palpations: Abdomen is soft.   Genitourinary:     Penis: Normal.    Musculoskeletal:         General: Normal range of motion.      Cervical back: Normal range of motion and neck supple.   Skin:     General: Skin is warm and dry.   Neurological:      General: No focal deficit present.      Mental Status: He is lethargic.   Psychiatric:         Attention and Perception: He perceives auditory hallucinations.         Mood and Affect: Mood is anxious.         Speech: Speech is slurred.         Behavior: Behavior is slowed.         Thought Content: Thought content is paranoid. Thought content does not include homicidal  ideation.         Cognition and Memory: Cognition is impaired.         Judgment: Judgment is impulsive.         Fluids    Intake/Output Summary (Last 24 hours) at 2/19/2021 1232  Last data filed at 2/19/2021 0733  Gross per 24 hour   Intake 890 ml   Output 1520 ml   Net -630 ml       Laboratory  Recent Labs     02/17/21  1650 02/18/21  0300 02/19/21  0310   WBC 15.6* 13.2* 9.9   RBC 4.07* 3.78* 3.53*   HEMOGLOBIN 12.3* 11.4* 10.7*   HEMATOCRIT 37.2* 34.4* 32.1*   MCV 91.4 91.0 90.9   MCH 30.2 30.2 30.3   MCHC 33.1* 33.1* 33.3*   RDW 46.3 46.7 46.0   PLATELETCT 232 222 208   MPV 8.6* 8.5* 8.8*     Recent Labs     02/17/21  1650 02/18/21  0300   SODIUM 135 136   POTASSIUM 4.4 4.1   CHLORIDE 105 107   CO2 19* 21   GLUCOSE 118* 143*   BUN 18 14   CREATININE 1.23 0.84   CALCIUM 8.4* 8.2*                   Imaging  DX-CHEST-PORTABLE (1 VIEW)   Final Result      1.  Well-positioned right IJ central catheter and ETT. No pneumothorax.   2.  NGT tip is in the distal esophagus. Recommend advancing 10 cm.   3.  Lungs are clear.           Assessment/Plan  Endotracheally intubated  Assessment & Plan  Patient transferred [facility] intubated due to drug overdose  Critical care consulted, ICU admission  Patient successfully extubated, monitor    Drug overdose- (present on admission)  Assessment & Plan  Patient report by EDP and  patient had intentionally overdosed  Suicidal behavior  Legal hold initiated, psychiatry consulting  Monitor for possible effects from polydrug overdose    Schizophrenia (HCC)  Assessment & Plan  Per psychiatry evaluation, reinitiate medication regimen once appropriate    Acute renal failure (ARF) (HCC)- (present on admission)  Assessment & Plan  Patient's creatinine 1.23 unclear on baseline  Monitor urine output  IV fluids  Avoid nephrotoxins    Suicidal behavior with attempted self-injury (HCC)- (present on admission)  Assessment & Plan  Legal hold, psychiatry  consulting    Leukocytosis- (present on admission)  Assessment & Plan  Patient WBC 15.6  Concern for aspiration pneumonia  Monitor clinically,     Plan  Overall improving  Monitor electrolytes, monitor respiratory status  Psychiatry evaluation appreciated  Legal hold initiated  Monitor for side effects from overdose  Follow labs closely  Patient voiced concern of possible GI bleed recurrence, currently no evidence, placed on Pepcid  See orders  Medically complex high risk patient    VTE prophylaxis: Lovenox  I have performed a physical exam and reviewed and updated ROS and Plan today . In review of yesterday's note , there are no changes except as documented above.        Please note that this dictation was created using voice recognition software. I have made every reasonable attempt to correct obvious errors, but I expect that there are errors of grammar and possibly context that I did not discover before finalizing the note.    This patient was seen under COVID 19 pandemic disaster response conditions.  During a disaster, the provisions of care is subject to the Crisis Standard of Care

## 2021-02-19 NOTE — ASSESSMENT & PLAN NOTE
Resolving, initial requiring fluids and IV pressors  CVP monitoring as needed  Further fluid resuscitation as needed  Suspect related to overdose on positive pressure ventilation

## 2021-02-20 LAB
ANION GAP SERPL CALC-SCNC: 11 MMOL/L (ref 7–16)
BASOPHILS # BLD AUTO: 0.8 % (ref 0–1.8)
BASOPHILS # BLD: 0.07 K/UL (ref 0–0.12)
BUN SERPL-MCNC: 12 MG/DL (ref 8–22)
CALCIUM SERPL-MCNC: 9 MG/DL (ref 8.5–10.5)
CHLORIDE SERPL-SCNC: 92 MMOL/L (ref 96–112)
CO2 SERPL-SCNC: 22 MMOL/L (ref 20–33)
CREAT SERPL-MCNC: 0.79 MG/DL (ref 0.5–1.4)
EOSINOPHIL # BLD AUTO: 0.18 K/UL (ref 0–0.51)
EOSINOPHIL NFR BLD: 2 % (ref 0–6.9)
ERYTHROCYTE [DISTWIDTH] IN BLOOD BY AUTOMATED COUNT: 42.3 FL (ref 35.9–50)
GLUCOSE SERPL-MCNC: 92 MG/DL (ref 65–99)
HCT VFR BLD AUTO: 35.5 % (ref 42–52)
HGB BLD-MCNC: 11.9 G/DL (ref 14–18)
IMM GRANULOCYTES # BLD AUTO: 0.02 K/UL (ref 0–0.11)
IMM GRANULOCYTES NFR BLD AUTO: 0.2 % (ref 0–0.9)
LYMPHOCYTES # BLD AUTO: 1.85 K/UL (ref 1–4.8)
LYMPHOCYTES NFR BLD: 20.2 % (ref 22–41)
MAGNESIUM SERPL-MCNC: 1.5 MG/DL (ref 1.5–2.5)
MCH RBC QN AUTO: 29.5 PG (ref 27–33)
MCHC RBC AUTO-ENTMCNC: 33.5 G/DL (ref 33.7–35.3)
MCV RBC AUTO: 88.1 FL (ref 81.4–97.8)
MONOCYTES # BLD AUTO: 0.97 K/UL (ref 0–0.85)
MONOCYTES NFR BLD AUTO: 10.6 % (ref 0–13.4)
NEUTROPHILS # BLD AUTO: 6.05 K/UL (ref 1.82–7.42)
NEUTROPHILS NFR BLD: 66.2 % (ref 44–72)
NRBC # BLD AUTO: 0 K/UL
NRBC BLD-RTO: 0 /100 WBC
PHOSPHATE SERPL-MCNC: 2.7 MG/DL (ref 2.5–4.5)
PLATELET # BLD AUTO: 193 K/UL (ref 164–446)
PMV BLD AUTO: 8.6 FL (ref 9–12.9)
POTASSIUM SERPL-SCNC: 4.2 MMOL/L (ref 3.6–5.5)
RBC # BLD AUTO: 4.03 M/UL (ref 4.7–6.1)
SODIUM SERPL-SCNC: 125 MMOL/L (ref 135–145)
WBC # BLD AUTO: 9.1 K/UL (ref 4.8–10.8)

## 2021-02-20 PROCEDURE — 700102 HCHG RX REV CODE 250 W/ 637 OVERRIDE(OP): Performed by: INTERNAL MEDICINE

## 2021-02-20 PROCEDURE — 99232 SBSQ HOSP IP/OBS MODERATE 35: CPT | Performed by: HOSPITALIST

## 2021-02-20 PROCEDURE — 700102 HCHG RX REV CODE 250 W/ 637 OVERRIDE(OP): Performed by: HOSPITALIST

## 2021-02-20 PROCEDURE — 700111 HCHG RX REV CODE 636 W/ 250 OVERRIDE (IP): Performed by: INTERNAL MEDICINE

## 2021-02-20 PROCEDURE — 85025 COMPLETE CBC W/AUTO DIFF WBC: CPT

## 2021-02-20 PROCEDURE — A9270 NON-COVERED ITEM OR SERVICE: HCPCS | Performed by: INTERNAL MEDICINE

## 2021-02-20 PROCEDURE — 700111 HCHG RX REV CODE 636 W/ 250 OVERRIDE (IP): Performed by: HOSPITALIST

## 2021-02-20 PROCEDURE — A9270 NON-COVERED ITEM OR SERVICE: HCPCS | Performed by: HOSPITALIST

## 2021-02-20 PROCEDURE — 700101 HCHG RX REV CODE 250: Performed by: INTERNAL MEDICINE

## 2021-02-20 PROCEDURE — 84100 ASSAY OF PHOSPHORUS: CPT

## 2021-02-20 PROCEDURE — 83735 ASSAY OF MAGNESIUM: CPT

## 2021-02-20 PROCEDURE — 770001 HCHG ROOM/CARE - MED/SURG/GYN PRIV*

## 2021-02-20 PROCEDURE — 80048 BASIC METABOLIC PNL TOTAL CA: CPT

## 2021-02-20 RX ORDER — MAGNESIUM SULFATE HEPTAHYDRATE 40 MG/ML
4 INJECTION, SOLUTION INTRAVENOUS ONCE
Status: COMPLETED | OUTPATIENT
Start: 2021-02-20 | End: 2021-02-20

## 2021-02-20 RX ORDER — LOSARTAN POTASSIUM 50 MG/1
100 TABLET ORAL DAILY
Status: DISCONTINUED | OUTPATIENT
Start: 2021-02-21 | End: 2021-02-24 | Stop reason: HOSPADM

## 2021-02-20 RX ORDER — HYDROXYZINE HYDROCHLORIDE 25 MG/1
50 TABLET, FILM COATED ORAL 4 TIMES DAILY PRN
Status: DISCONTINUED | OUTPATIENT
Start: 2021-02-20 | End: 2021-02-24 | Stop reason: HOSPADM

## 2021-02-20 RX ADMIN — LOSARTAN POTASSIUM 50 MG: 50 TABLET, FILM COATED ORAL at 05:24

## 2021-02-20 RX ADMIN — HYDROXYZINE HYDROCHLORIDE 50 MG: 25 TABLET, FILM COATED ORAL at 18:20

## 2021-02-20 RX ADMIN — DOCUSATE SODIUM 50 MG AND SENNOSIDES 8.6 MG 2 TABLET: 8.6; 5 TABLET, FILM COATED ORAL at 05:24

## 2021-02-20 RX ADMIN — ENOXAPARIN SODIUM 40 MG: 40 INJECTION SUBCUTANEOUS at 05:25

## 2021-02-20 RX ADMIN — ATORVASTATIN CALCIUM 20 MG: 20 TABLET, FILM COATED ORAL at 05:24

## 2021-02-20 RX ADMIN — FAMOTIDINE 20 MG: 20 TABLET ORAL at 17:10

## 2021-02-20 RX ADMIN — LABETALOL HYDROCHLORIDE 10 MG: 5 INJECTION, SOLUTION INTRAVENOUS at 13:09

## 2021-02-20 RX ADMIN — MAGNESIUM SULFATE IN WATER 4 G: 40 INJECTION, SOLUTION INTRAVENOUS at 07:45

## 2021-02-20 RX ADMIN — FAMOTIDINE 20 MG: 20 TABLET ORAL at 05:24

## 2021-02-20 RX ADMIN — LABETALOL HYDROCHLORIDE 10 MG: 5 INJECTION, SOLUTION INTRAVENOUS at 07:52

## 2021-02-20 ASSESSMENT — ENCOUNTER SYMPTOMS
HALLUCINATIONS: 1
MUSCULOSKELETAL NEGATIVE: 1
NERVOUS/ANXIOUS: 1
SORE THROAT: 1
NEUROLOGICAL NEGATIVE: 1
EYES NEGATIVE: 1
DEPRESSION: 1
RESPIRATORY NEGATIVE: 1
CARDIOVASCULAR NEGATIVE: 1
GASTROINTESTINAL NEGATIVE: 1

## 2021-02-20 ASSESSMENT — PATIENT HEALTH QUESTIONNAIRE - PHQ9
1. LITTLE INTEREST OR PLEASURE IN DOING THINGS: NOT AT ALL
SUM OF ALL RESPONSES TO PHQ9 QUESTIONS 1 AND 2: 0
3. TROUBLE FALLING OR STAYING ASLEEP OR SLEEPING TOO MUCH: SEVERAL DAYS

## 2021-02-20 NOTE — PROGRESS NOTES
Hospital Medicine Daily Progress Note    Date of Service  2/20/2021    Chief Complaint  50 y.o. male admitted 2/17/2021 with history of schizophrenia, ADHD admitted from an outlying hospital in [facility] secondary to drug overdose and suicidal attempt.    Hospital Course  50-year-old male with a history of schizophrenia, otherwise unspecified mental disorder admitted with probably medication overdose and suicidal attempt.  The patient initially found hemodynamically unstable and required vasopressors and intubation to protect airway.  The patient admitted to ICU with the critical care team.  Psychiatry consulted.    Interval Problem Update  Patient seen and examined today. ICU Care  Care and plan discussed in IDT/Hot rounds.  Lines and assistive devices reviewed.    Patient tolerating treatment and therapies.  All Data, Medication data reviewed.  Case discussed with nursing as available.  Plan of Care reviewed with patient and notified of changes.  2/18 patient successfully extubated, admitted to psychiatry team that he overdosed on large amount of pills, stating that he took half bottle of everything.  Reportedly the patient is chronically on escitalopram, Vistaril, oxcarbazepine, atorvastatin, losartan, mirtazapine, olanzapine.  Today after extubation patient is alert and oriented 2-3, following commands, sinus rhythm in the 70s, blood pressure in the 80s to 110s, norepinephrine is titrated off, urine output is adequate, afebrile, white cell count 13.2.  The patient is placed on legal hold  2/19 the patient feels better, he continues to worry about the thoughts in his head's, he states there are Rushing, flighting, he is eating well, he denies current pain, slight sore throat, psychiatry follow-up pending, continue legal hold  2/20 the patient overall stabilizing, he drinks lots of coffee, is trying to hydrate, some mild abdominal complaints but vague, still hearing voices  Consultants/Specialty  Pulmonary  critical care  Psychiatry  Code Status  Full Code    Disposition  After stabilization will need to be transferred to a inpatient psychiatric hospital    Review of Systems  Review of Systems   Constitutional: Positive for malaise/fatigue.   HENT: Positive for sore throat.    Eyes: Negative.    Respiratory: Negative.    Cardiovascular: Negative.    Gastrointestinal: Negative.    Genitourinary: Negative.    Musculoskeletal: Negative.    Skin: Negative.    Neurological: Negative.    Endo/Heme/Allergies: Negative.    Psychiatric/Behavioral: Positive for depression and hallucinations. The patient is nervous/anxious.         Hearing voices   All other systems reviewed and are negative.       Physical Exam  Temp:  [36.6 °C (97.8 °F)-37.4 °C (99.3 °F)] 36.6 °C (97.8 °F)  Pulse:  [68-82] 82  Resp:  [15-24] 18  BP: (143-156)/() 156/93  SpO2:  [93 %-94 %] 93 %    Physical Exam  Vitals and nursing note reviewed.   Constitutional:       Appearance: He is well-developed. He is ill-appearing. He is not toxic-appearing or diaphoretic.      Interventions: Nasal cannula in place.      Comments: Pt seen and examined.   HENT:      Head: Normocephalic and atraumatic.   Eyes:      Pupils: Pupils are equal, round, and reactive to light.   Cardiovascular:      Rate and Rhythm: Normal rate.      Heart sounds: Normal heart sounds.   Pulmonary:      Effort: Pulmonary effort is normal.      Breath sounds: Normal breath sounds.   Abdominal:      General: Bowel sounds are normal.      Palpations: Abdomen is soft.   Genitourinary:     Penis: Normal.    Musculoskeletal:         General: Normal range of motion.      Cervical back: Normal range of motion and neck supple.   Skin:     General: Skin is warm and dry.   Neurological:      General: No focal deficit present.      Mental Status: He is lethargic.   Psychiatric:         Attention and Perception: He perceives auditory hallucinations.         Mood and Affect: Mood is anxious.          Speech: Speech is slurred.         Behavior: Behavior is slowed.         Thought Content: Thought content is paranoid. Thought content does not include homicidal ideation.         Cognition and Memory: Cognition is impaired.         Judgment: Judgment is impulsive.         Fluids    Intake/Output Summary (Last 24 hours) at 2/20/2021 0840  Last data filed at 2/19/2021 2100  Gross per 24 hour   Intake 410 ml   Output --   Net 410 ml       Laboratory  Recent Labs     02/18/21  0300 02/19/21  0310 02/20/21  0320   WBC 13.2* 9.9 9.1   RBC 3.78* 3.53* 4.03*   HEMOGLOBIN 11.4* 10.7* 11.9*   HEMATOCRIT 34.4* 32.1* 35.5*   MCV 91.0 90.9 88.1   MCH 30.2 30.3 29.5   MCHC 33.1* 33.3* 33.5*   RDW 46.7 46.0 42.3   PLATELETCT 222 208 193   MPV 8.5* 8.8* 8.6*     Recent Labs     02/17/21  1650 02/18/21  0300 02/20/21  0320   SODIUM 135 136 125*   POTASSIUM 4.4 4.1 4.2   CHLORIDE 105 107 92*   CO2 19* 21 22   GLUCOSE 118* 143* 92   BUN 18 14 12   CREATININE 1.23 0.84 0.79   CALCIUM 8.4* 8.2* 9.0                   Imaging  DX-CHEST-PORTABLE (1 VIEW)   Final Result      1.  Well-positioned right IJ central catheter and ETT. No pneumothorax.   2.  NGT tip is in the distal esophagus. Recommend advancing 10 cm.   3.  Lungs are clear.           Assessment/Plan  Endotracheally intubated  Assessment & Plan  Patient transferred [facility] intubated due to drug overdose  Critical care consulted, ICU admission  Patient successfully extubated, monitor    Drug overdose- (present on admission)  Assessment & Plan  Patient report by EDP and  patient had intentionally overdosed  Suicidal behavior  Legal hold initiated, psychiatry consulting  Monitor for possible effects from polydrug overdose    Schizophrenia (HCC)  Assessment & Plan  Per psychiatry evaluation, reinitiate medication regimen once appropriate    Acute renal failure (ARF) (HCC)- (present on admission)  Assessment & Plan  Patient's creatinine 1.23 unclear on  baseline  Monitor urine output  IV fluids  Avoid nephrotoxins    Suicidal behavior with attempted self-injury (HCC)- (present on admission)  Assessment & Plan  Legal hold, psychiatry consulting    Leukocytosis- (present on admission)  Assessment & Plan  Patient WBC 15.6  Concern for aspiration pneumonia  Monitor clinically,     Plan  Overall improving  Monitor electrolytes, monitor respiratory status  Psychiatry evaluation appreciated  Legal hold extended  Follow labs closely  See orders  Medically complex high risk patient    VTE prophylaxis: Lovenox  I have performed a physical exam and reviewed and updated ROS and Plan today . In review of yesterday's note , there are no changes except as documented above.        Please note that this dictation was created using voice recognition software. I have made every reasonable attempt to correct obvious errors, but I expect that there are errors of grammar and possibly context that I did not discover before finalizing the note.    This patient was seen under COVID 19 pandemic disaster response conditions.  During a disaster, the provisions of care is subject to the Crisis Standard of Care

## 2021-02-20 NOTE — CONSULTS
PSYCHIATRY CONSULT TEAM NOTE: Consult received for psychotherapy. Already discussed consult with Dr. Islas. Will see patient when able. Please see her most recent note for more information.     Thank you.

## 2021-02-21 PROBLEM — K08.9 POOR DENTITION: Status: ACTIVE | Noted: 2021-02-21

## 2021-02-21 LAB
ANION GAP SERPL CALC-SCNC: 11 MMOL/L (ref 7–16)
BASOPHILS # BLD AUTO: 1.1 % (ref 0–1.8)
BASOPHILS # BLD: 0.1 K/UL (ref 0–0.12)
BUN SERPL-MCNC: 10 MG/DL (ref 8–22)
CALCIUM SERPL-MCNC: 10.3 MG/DL (ref 8.5–10.5)
CHLORIDE SERPL-SCNC: 94 MMOL/L (ref 96–112)
CO2 SERPL-SCNC: 25 MMOL/L (ref 20–33)
CREAT SERPL-MCNC: 0.85 MG/DL (ref 0.5–1.4)
EOSINOPHIL # BLD AUTO: 0.19 K/UL (ref 0–0.51)
EOSINOPHIL NFR BLD: 2.1 % (ref 0–6.9)
ERYTHROCYTE [DISTWIDTH] IN BLOOD BY AUTOMATED COUNT: 41.8 FL (ref 35.9–50)
GLUCOSE SERPL-MCNC: 148 MG/DL (ref 65–99)
HCT VFR BLD AUTO: 44.7 % (ref 42–52)
HGB BLD-MCNC: 15.1 G/DL (ref 14–18)
IMM GRANULOCYTES # BLD AUTO: 0.04 K/UL (ref 0–0.11)
IMM GRANULOCYTES NFR BLD AUTO: 0.4 % (ref 0–0.9)
LYMPHOCYTES # BLD AUTO: 1.65 K/UL (ref 1–4.8)
LYMPHOCYTES NFR BLD: 18 % (ref 22–41)
MAGNESIUM SERPL-MCNC: 2 MG/DL (ref 1.5–2.5)
MCH RBC QN AUTO: 30 PG (ref 27–33)
MCHC RBC AUTO-ENTMCNC: 33.8 G/DL (ref 33.7–35.3)
MCV RBC AUTO: 88.9 FL (ref 81.4–97.8)
MONOCYTES # BLD AUTO: 0.7 K/UL (ref 0–0.85)
MONOCYTES NFR BLD AUTO: 7.6 % (ref 0–13.4)
NEUTROPHILS # BLD AUTO: 6.49 K/UL (ref 1.82–7.42)
NEUTROPHILS NFR BLD: 70.8 % (ref 44–72)
NRBC # BLD AUTO: 0 K/UL
NRBC BLD-RTO: 0 /100 WBC
PLATELET # BLD AUTO: 251 K/UL (ref 164–446)
PMV BLD AUTO: 8.7 FL (ref 9–12.9)
POTASSIUM SERPL-SCNC: 4.8 MMOL/L (ref 3.6–5.5)
RBC # BLD AUTO: 5.03 M/UL (ref 4.7–6.1)
SODIUM SERPL-SCNC: 130 MMOL/L (ref 135–145)
WBC # BLD AUTO: 9.2 K/UL (ref 4.8–10.8)

## 2021-02-21 PROCEDURE — A9270 NON-COVERED ITEM OR SERVICE: HCPCS | Performed by: INTERNAL MEDICINE

## 2021-02-21 PROCEDURE — A9270 NON-COVERED ITEM OR SERVICE: HCPCS | Performed by: HOSPITALIST

## 2021-02-21 PROCEDURE — 85025 COMPLETE CBC W/AUTO DIFF WBC: CPT

## 2021-02-21 PROCEDURE — 99232 SBSQ HOSP IP/OBS MODERATE 35: CPT | Performed by: HOSPITALIST

## 2021-02-21 PROCEDURE — 700102 HCHG RX REV CODE 250 W/ 637 OVERRIDE(OP): Performed by: INTERNAL MEDICINE

## 2021-02-21 PROCEDURE — 700102 HCHG RX REV CODE 250 W/ 637 OVERRIDE(OP): Performed by: PSYCHIATRY & NEUROLOGY

## 2021-02-21 PROCEDURE — 99233 SBSQ HOSP IP/OBS HIGH 50: CPT | Performed by: PSYCHIATRY & NEUROLOGY

## 2021-02-21 PROCEDURE — 700102 HCHG RX REV CODE 250 W/ 637 OVERRIDE(OP): Performed by: HOSPITALIST

## 2021-02-21 PROCEDURE — 83735 ASSAY OF MAGNESIUM: CPT

## 2021-02-21 PROCEDURE — 80048 BASIC METABOLIC PNL TOTAL CA: CPT

## 2021-02-21 PROCEDURE — 770001 HCHG ROOM/CARE - MED/SURG/GYN PRIV*

## 2021-02-21 PROCEDURE — A9270 NON-COVERED ITEM OR SERVICE: HCPCS | Performed by: PSYCHIATRY & NEUROLOGY

## 2021-02-21 PROCEDURE — 700111 HCHG RX REV CODE 636 W/ 250 OVERRIDE (IP): Performed by: INTERNAL MEDICINE

## 2021-02-21 RX ORDER — ESCITALOPRAM OXALATE 10 MG/1
10 TABLET ORAL DAILY
Status: DISCONTINUED | OUTPATIENT
Start: 2021-02-21 | End: 2021-02-24 | Stop reason: HOSPADM

## 2021-02-21 RX ORDER — LORAZEPAM 0.5 MG/1
0.5 TABLET ORAL 3 TIMES DAILY
Status: DISCONTINUED | OUTPATIENT
Start: 2021-02-21 | End: 2021-02-24 | Stop reason: HOSPADM

## 2021-02-21 RX ORDER — OLANZAPINE 5 MG/1
10 TABLET, ORALLY DISINTEGRATING ORAL EVERY EVENING
Status: DISCONTINUED | OUTPATIENT
Start: 2021-02-21 | End: 2021-02-24 | Stop reason: HOSPADM

## 2021-02-21 RX ADMIN — HYDROXYZINE HYDROCHLORIDE 50 MG: 25 TABLET, FILM COATED ORAL at 23:00

## 2021-02-21 RX ADMIN — LORAZEPAM 0.5 MG: 0.5 TABLET ORAL at 14:55

## 2021-02-21 RX ADMIN — HYDROXYZINE HYDROCHLORIDE 50 MG: 25 TABLET, FILM COATED ORAL at 07:58

## 2021-02-21 RX ADMIN — LOSARTAN POTASSIUM 100 MG: 50 TABLET, FILM COATED ORAL at 05:17

## 2021-02-21 RX ADMIN — ENOXAPARIN SODIUM 40 MG: 40 INJECTION SUBCUTANEOUS at 05:17

## 2021-02-21 RX ADMIN — FAMOTIDINE 20 MG: 20 TABLET ORAL at 17:25

## 2021-02-21 RX ADMIN — ATORVASTATIN CALCIUM 20 MG: 20 TABLET, FILM COATED ORAL at 05:17

## 2021-02-21 RX ADMIN — OLANZAPINE 10 MG: 5 TABLET, ORALLY DISINTEGRATING ORAL at 17:25

## 2021-02-21 RX ADMIN — HYDROXYZINE HYDROCHLORIDE 50 MG: 25 TABLET, FILM COATED ORAL at 14:55

## 2021-02-21 RX ADMIN — LORAZEPAM 0.5 MG: 0.5 TABLET ORAL at 17:25

## 2021-02-21 RX ADMIN — LIDOCAINE HYDROCHLORIDE 5 ML: 20 SOLUTION OROPHARYNGEAL at 23:00

## 2021-02-21 RX ADMIN — FAMOTIDINE 20 MG: 20 TABLET ORAL at 05:17

## 2021-02-21 RX ADMIN — DOCUSATE SODIUM 50 MG AND SENNOSIDES 8.6 MG 2 TABLET: 8.6; 5 TABLET, FILM COATED ORAL at 05:17

## 2021-02-21 RX ADMIN — LIDOCAINE HYDROCHLORIDE 5 ML: 20 SOLUTION OROPHARYNGEAL at 17:25

## 2021-02-21 RX ADMIN — ESCITALOPRAM OXALATE 10 MG: 10 TABLET ORAL at 14:55

## 2021-02-21 RX ADMIN — DOCUSATE SODIUM 50 MG AND SENNOSIDES 8.6 MG 2 TABLET: 8.6; 5 TABLET, FILM COATED ORAL at 17:25

## 2021-02-21 ASSESSMENT — PAIN DESCRIPTION - PAIN TYPE: TYPE: ACUTE PAIN

## 2021-02-21 NOTE — CARE PLAN
Reviewed plan of care and treatment plan with patient.  Verbalized his understanding.  Will continue to monitor and provide reinforcement as needed.    Problem: Safety  Goal: Will remain free from injury  Outcome: PROGRESSING AS EXPECTED     Problem: Knowledge Deficit  Goal: Knowledge of disease process/condition, treatment plan, diagnostic tests, and medications will improve  Outcome: PROGRESSING AS EXPECTED     Problem: Psychosocial Needs:  Goal: Level of anxiety will decrease  Outcome: PROGRESSING AS EXPECTED

## 2021-02-21 NOTE — PROGRESS NOTES
[facility] Medicine Daily Progress Note    Date of Service  2/21/2021    Chief Complaint  50 y.o. male admitted 2/17/2021 with history of schizophrenia, ADHD admitted from an outlying hospital in [facility] secondary to drug overdose and suicidal attempt.    Hospital Course  50-year-old male with a history of schizophrenia, otherwise unspecified mental disorder admitted with probably medication overdose and suicidal attempt.  The patient initially found hemodynamically unstable and required vasopressors and intubation to protect airway.  The patient admitted to ICU with the critical care team.  Psychiatry consulted.    Interval Problem Update  Patient seen and examined today. ICU Care  Care and plan discussed in IDT/Hot rounds.  Lines and assistive devices reviewed.    Patient tolerating treatment and therapies.  All Data, Medication data reviewed.  Case discussed with nursing as available.  Plan of Care reviewed with patient and notified of changes.  2/18 patient successfully extubated, admitted to psychiatry team that he overdosed on large amount of pills, stating that he took half bottle of everything.  Reportedly the patient is chronically on escitalopram, Vistaril, oxcarbazepine, atorvastatin, losartan, mirtazapine, olanzapine.  Today after extubation patient is alert and oriented 2-3, following commands, sinus rhythm in the 70s, blood pressure in the 80s to 110s, norepinephrine is titrated off, urine output is adequate, afebrile, white cell count 13.2.  The patient is placed on legal hold  2/19 the patient feels better, he continues to worry about the thoughts in his head's, he states there are Rushing, flighting, he is eating well, he denies current pain, slight sore throat, psychiatry follow-up pending, continue legal hold  2/20 the patient overall stabilizing, he drinks lots of coffee, is trying to hydrate, some mild abdominal complaints but vague, still hearing voices  2/21 the patient complaining of  some tooth pain, gum pain, poor dentition noted, updated psychiatry in terms of medication needs.  Patient continues to have auditory hallucinations, anxiety, extremely restless  Consultants/Specialty  Pulmonary critical care  Psychiatry  Code Status  Full Code    Disposition  After stabilization will need to be transferred to a inpatient psychiatric hospital    Review of Systems  Review of Systems   Constitutional: Positive for malaise/fatigue.   HENT: Positive for sore throat.    Eyes: Negative.    Respiratory: Negative.    Cardiovascular: Negative.    Gastrointestinal: Negative.    Genitourinary: Negative.    Musculoskeletal: Negative.    Skin: Negative.    Neurological: Negative.    Endo/Heme/Allergies: Negative.    Psychiatric/Behavioral: Positive for depression and hallucinations. The patient is nervous/anxious.         Hearing voices   All other systems reviewed and are negative.       Physical Exam  Temp:  [36.4 °C (97.6 °F)-36.7 °C (98.1 °F)] 36.4 °C (97.6 °F)  Pulse:  [61-66] 64  Resp:  [16-17] 16  BP: (131-156)/(85-95) 156/91  SpO2:  [95 %-98 %] 95 %    Physical Exam  Vitals and nursing note reviewed.   Constitutional:       Appearance: He is well-developed. He is ill-appearing. He is not toxic-appearing or diaphoretic.      Interventions: Nasal cannula in place.      Comments: Pt seen and examined.   HENT:      Head: Normocephalic and atraumatic.   Eyes:      Pupils: Pupils are equal, round, and reactive to light.   Cardiovascular:      Rate and Rhythm: Normal rate.      Heart sounds: Normal heart sounds.   Pulmonary:      Effort: Pulmonary effort is normal.      Breath sounds: Normal breath sounds.   Abdominal:      General: Bowel sounds are normal.      Palpations: Abdomen is soft.   Genitourinary:     Penis: Normal.    Musculoskeletal:         General: Normal range of motion.      Cervical back: Normal range of motion and neck supple.   Skin:     General: Skin is warm and dry.   Neurological:       General: No focal deficit present.      Mental Status: He is lethargic.   Psychiatric:         Attention and Perception: He perceives auditory hallucinations.         Mood and Affect: Mood is anxious.         Speech: Speech is slurred.         Behavior: Behavior is slowed.         Thought Content: Thought content is paranoid. Thought content does not include homicidal ideation.         Cognition and Memory: Cognition is impaired.         Judgment: Judgment is impulsive.         Fluids    Intake/Output Summary (Last 24 hours) at 2/21/2021 1354  Last data filed at 2/21/2021 0800  Gross per 24 hour   Intake 720 ml   Output --   Net 720 ml       Laboratory  Recent Labs     02/19/21  0310 02/20/21  0320 02/21/21  1003   WBC 9.9 9.1 9.2   RBC 3.53* 4.03* 5.03   HEMOGLOBIN 10.7* 11.9* 15.1   HEMATOCRIT 32.1* 35.5* 44.7   MCV 90.9 88.1 88.9   MCH 30.3 29.5 30.0   MCHC 33.3* 33.5* 33.8   RDW 46.0 42.3 41.8   PLATELETCT 208 193 251   MPV 8.8* 8.6* 8.7*     Recent Labs     02/20/21  0320 02/21/21  1003   SODIUM 125* 130*   POTASSIUM 4.2 4.8   CHLORIDE 92* 94*   CO2 22 25   GLUCOSE 92 148*   BUN 12 10   CREATININE 0.79 0.85   CALCIUM 9.0 10.3                   Imaging  DX-CHEST-PORTABLE (1 VIEW)   Final Result      1.  Well-positioned right IJ central catheter and ETT. No pneumothorax.   2.  NGT tip is in the distal esophagus. Recommend advancing 10 cm.   3.  Lungs are clear.           Assessment/Plan  Schizophrenia (HCC)- (present on admission)  Assessment & Plan  Per psychiatry evaluation, reinitiate medication regimen once appropriate    Suicidal behavior with attempted self-injury (HCC)- (present on admission)  Assessment & Plan  Legal hold, psychiatry consulting    Endotracheally intubated  Assessment & Plan  Patient transferred [facility] intubated due to drug overdose  Critical care consulted, ICU admission  Patient successfully extubated, monitor    Acute renal failure (ARF) (HCC)- (present on  admission)  Assessment & Plan  Patient's creatinine 1.23 unclear on baseline  Monitor urine output  IV fluids  Avoid nephrotoxins    Drug overdose- (present on admission)  Assessment & Plan  Patient report by EDP and  patient had intentionally overdosed  Suicidal behavior  Legal hold initiated, psychiatry consulting  Monitor for possible effects from polydrug overdose    Poor dentition- (present on admission)  Assessment & Plan  Needs semiurgent dental extractions    Leukocytosis- (present on admission)  Assessment & Plan  Patient WBC 15.6  Concern for aspiration pneumonia  Monitor clinically,     Plan  Magic mouthwash as needed for gum pain, will need tooth extractions as soon as possible  Monitor electrolytes, monitor respiratory status  Psychiatry evaluation appreciated  Legal hold extended  Follow labs closely  See orders  Medically complex high risk patient    VTE prophylaxis: Lovenox  I have performed a physical exam and reviewed and updated ROS and Plan today . In review of yesterday's note , there are no changes except as documented above.        Please note that this dictation was created using voice recognition software. I have made every reasonable attempt to correct obvious errors, but I expect that there are errors of grammar and possibly context that I did not discover before finalizing the note.    This patient was seen under COVID 19 pandemic disaster response conditions.  During a disaster, the provisions of care is subject to the Crisis Standard of Care

## 2021-02-21 NOTE — PROGRESS NOTES
Unsuccessful on lab draw this morning.  Contacted May, Lead Phlebotomist, and she will have someone come to draw labs.

## 2021-02-21 NOTE — CONSULTS
"PSYCHIATRIC FOLLOW-UP:(established)  *Reason for admission: Suicide attempt by overdose on pills . Asked to see pt again for racing thoughts, anxiety.   *Legal Hold Status: on legal hold                *HPI: pt is very restless constantly moving his feet and wringing his hands. He admits to same. Also admits to depression with mood an \"8\" (bad side) but denies SI. His thoughts are racing but have a strong anxiety component to them. He hears voices that say \"go get me\" and \"grandpa and help me\". They also say other things and in the past, not now, they have been command to hurt self and others. He looks very distressed.    Slept \"alittle\"     *Psychiatric Examination:   Vitals:   Vitals:    02/20/21 1500 02/20/21 1600 02/20/21 2000 02/21/21 0800   BP: 143/93 151/92 149/95 156/91   Pulse: 61 64 62 64   Resp: 16 16 17 16   Temp:  36.6 °C (97.8 °F) 36.7 °C (98.1 °F) 36.4 °C (97.6 °F)   TempSrc:  Temporal Temporal Temporal   SpO2:  97%  95%   Weight:       Height:       Appearance:  good eye contact  Abnormal movements: restless, not akathesia  Gait/posture: lying in bed  Speech: wnl  Though process: increased rate  Associations: linear  Thought content: denies AVH,but does not appear to interfere with ability to communicate   Judgement and Insight:improved  Orientation:grossly intact  Recent and Remote Memory: intact  Attention Span and Concentration: intact  Language: fluid   Fund of Knowledge: not tested   Mood and Affect:, distressed, depressed, anxious  SI/HI:denies      EKG personally reviewed QTc 465  Cranial Imaging: personally reviewed CT 2010: neg  Results for CURTIS PICKERING (MRN [id number]) as of 2/21/2021 12:22   Ref. Range 2/17/2021 17:49 2/18/2021 03:00 2/19/2021 03:10 2/20/2021 03:20 2/21/2021 10:03   Sodium Latest Ref Range: 135 - 145 mmol/L  136  125 (L) 130 (L)         *ASSESSMENT/RECOMENDATIONS:  1. Schizoaffective disorder:: does not appear stable  - restarting lexapro 10 mg but target is 20 " mg  -restarting zyprexa 10 mg. Target is 15-20 mg  -ativan 0.5 mg tid but hold if sedated  -         2. Medical  - hyponatremia           Legal hold: extended  Observation status: 1:1 sitter  Privileges (while on legal hold): as per dr naylor      *Will Follow

## 2021-02-21 NOTE — PROGRESS NOTES
[facility] Medicine Daily Progress Note    Date of Service  2/21/2021    Chief Complaint  50 y.o. male admitted 2/17/2021 with history of schizophrenia, ADHD admitted from an outlying hospital in [facility] secondary to drug overdose and suicidal attempt.    Hospital Course  50-year-old male with a history of schizophrenia, otherwise unspecified mental disorder admitted with probably medication overdose and suicidal attempt.  The patient initially found hemodynamically unstable and required vasopressors and intubation to protect airway.  The patient admitted to ICU with the critical care team.  Psychiatry consulted.    Interval Problem Update  Patient seen and examined today. ICU Care  Care and plan discussed in IDT/Hot rounds.  Lines and assistive devices reviewed.    Patient tolerating treatment and therapies.  All Data, Medication data reviewed.  Case discussed with nursing as available.  Plan of Care reviewed with patient and notified of changes.  2/18 patient successfully extubated, admitted to psychiatry team that he overdosed on large amount of pills, stating that he took half bottle of everything.  Reportedly the patient is chronically on escitalopram, Vistaril, oxcarbazepine, atorvastatin, losartan, mirtazapine, olanzapine.  Today after extubation patient is alert and oriented 2-3, following commands, sinus rhythm in the 70s, blood pressure in the 80s to 110s, norepinephrine is titrated off, urine output is adequate, afebrile, white cell count 13.2.  The patient is placed on legal hold  2/19 the patient feels better, he continues to worry about the thoughts in his head's, he states there are Rushing, flighting, he is eating well, he denies current pain, slight sore throat, psychiatry follow-up pending, continue legal hold  2/20 the patient overall stabilizing, he drinks lots of coffee, is trying to hydrate, some mild abdominal complaints but vague, still hearing voices  2/21 the patient complaining of  some tooth pain, gum pain, poor dentition noted, updated psychiatry in terms of medication needs.  Patient continues to have auditory hallucinations, anxiety, extremely restless  Consultants/Specialty  Pulmonary critical care  Psychiatry  Code Status  Full Code    Disposition  After stabilization will need to be transferred to a inpatient psychiatric hospital    Review of Systems  Review of Systems   Constitutional: Positive for malaise/fatigue.   HENT: Positive for sore throat.    Eyes: Negative.    Respiratory: Negative.    Cardiovascular: Negative.    Gastrointestinal: Negative.    Genitourinary: Negative.    Musculoskeletal: Negative.    Skin: Negative.    Neurological: Negative.    Endo/Heme/Allergies: Negative.    Psychiatric/Behavioral: Positive for depression and hallucinations. The patient is nervous/anxious.         Hearing voices   All other systems reviewed and are negative.       Physical Exam  Temp:  [36.4 °C (97.6 °F)-36.7 °C (98.1 °F)] 36.4 °C (97.6 °F)  Pulse:  [61-66] 64  Resp:  [16-17] 16  BP: (131-156)/(85-95) 156/91  SpO2:  [95 %-98 %] 95 %    Physical Exam  Vitals and nursing note reviewed.   Constitutional:       Appearance: He is well-developed. He is ill-appearing. He is not toxic-appearing or diaphoretic.      Interventions: Nasal cannula in place.      Comments: Pt seen and examined.   HENT:      Head: Normocephalic and atraumatic.   Eyes:      Pupils: Pupils are equal, round, and reactive to light.   Cardiovascular:      Rate and Rhythm: Normal rate.      Heart sounds: Normal heart sounds.   Pulmonary:      Effort: Pulmonary effort is normal.      Breath sounds: Normal breath sounds.   Abdominal:      General: Bowel sounds are normal.      Palpations: Abdomen is soft.   Genitourinary:     Penis: Normal.    Musculoskeletal:         General: Normal range of motion.      Cervical back: Normal range of motion and neck supple.   Skin:     General: Skin is warm and dry.   Neurological:       General: No focal deficit present.      Mental Status: He is lethargic.   Psychiatric:         Attention and Perception: He perceives auditory hallucinations.         Mood and Affect: Mood is anxious.         Speech: Speech is slurred.         Behavior: Behavior is slowed.         Thought Content: Thought content is paranoid. Thought content does not include homicidal ideation.         Cognition and Memory: Cognition is impaired.         Judgment: Judgment is impulsive.         Fluids    Intake/Output Summary (Last 24 hours) at 2/21/2021 1354  Last data filed at 2/21/2021 0800  Gross per 24 hour   Intake 720 ml   Output --   Net 720 ml       Laboratory  Recent Labs     02/19/21  0310 02/20/21  0320 02/21/21  1003   WBC 9.9 9.1 9.2   RBC 3.53* 4.03* 5.03   HEMOGLOBIN 10.7* 11.9* 15.1   HEMATOCRIT 32.1* 35.5* 44.7   MCV 90.9 88.1 88.9   MCH 30.3 29.5 30.0   MCHC 33.3* 33.5* 33.8   RDW 46.0 42.3 41.8   PLATELETCT 208 193 251   MPV 8.8* 8.6* 8.7*     Recent Labs     02/20/21  0320 02/21/21  1003   SODIUM 125* 130*   POTASSIUM 4.2 4.8   CHLORIDE 92* 94*   CO2 22 25   GLUCOSE 92 148*   BUN 12 10   CREATININE 0.79 0.85   CALCIUM 9.0 10.3                   Imaging  DX-CHEST-PORTABLE (1 VIEW)   Final Result      1.  Well-positioned right IJ central catheter and ETT. No pneumothorax.   2.  NGT tip is in the distal esophagus. Recommend advancing 10 cm.   3.  Lungs are clear.           Assessment/Plan  Schizophrenia (HCC)- (present on admission)  Assessment & Plan  Per psychiatry evaluation, reinitiate medication regimen once appropriate    Suicidal behavior with attempted self-injury (HCC)- (present on admission)  Assessment & Plan  Legal hold, psychiatry consulting    Endotracheally intubated  Assessment & Plan  Patient transferred [facility] intubated due to drug overdose  Critical care consulted, ICU admission  Patient successfully extubated, monitor    Acute renal failure (ARF) (HCC)- (present on  admission)  Assessment & Plan  Patient's creatinine 1.23 unclear on baseline  Monitor urine output  IV fluids  Avoid nephrotoxins    Drug overdose- (present on admission)  Assessment & Plan  Patient report by EDP and  patient had intentionally overdosed  Suicidal behavior  Legal hold initiated, psychiatry consulting  Monitor for possible effects from polydrug overdose    Poor dentition- (present on admission)  Assessment & Plan  Needs semiurgent dental extractions    Leukocytosis- (present on admission)  Assessment & Plan  Patient WBC 15.6  Concern for aspiration pneumonia  Monitor clinically,     Plan  Magic mouthwash as needed for gum pain, will need tooth extractions as soon as possible  Monitor electrolytes, monitor respiratory status, all improved  Psychiatry evaluation requested for medication initiation  Legal hold extended, inpatient psychiatric transfer pending  Follow labs closely  See orders  Medically complex high risk patient    VTE prophylaxis: Lovenox  I have performed a physical exam and reviewed and updated ROS and Plan today . In review of yesterday's note , there are no changes except as documented above.        Please note that this dictation was created using voice recognition software. I have made every reasonable attempt to correct obvious errors, but I expect that there are errors of grammar and possibly context that I did not discover before finalizing the note.    This patient was seen under COVID 19 pandemic disaster response conditions.  During a disaster, the provisions of care is subject to the Crisis Standard of Care

## 2021-02-22 LAB
ALBUMIN SERPL BCP-MCNC: 4.6 G/DL (ref 3.2–4.9)
ALBUMIN/GLOB SERPL: 1.5 G/DL
ALP SERPL-CCNC: 116 U/L (ref 30–99)
ALT SERPL-CCNC: 39 U/L (ref 2–50)
ANION GAP SERPL CALC-SCNC: 13 MMOL/L (ref 7–16)
AST SERPL-CCNC: 21 U/L (ref 12–45)
BASOPHILS # BLD AUTO: 1 % (ref 0–1.8)
BASOPHILS # BLD: 0.1 K/UL (ref 0–0.12)
BILIRUB SERPL-MCNC: 0.2 MG/DL (ref 0.1–1.5)
BUN SERPL-MCNC: 18 MG/DL (ref 8–22)
CALCIUM SERPL-MCNC: 10.5 MG/DL (ref 8.5–10.5)
CHLORIDE SERPL-SCNC: 101 MMOL/L (ref 96–112)
CO2 SERPL-SCNC: 20 MMOL/L (ref 20–33)
CREAT SERPL-MCNC: 0.91 MG/DL (ref 0.5–1.4)
EOSINOPHIL # BLD AUTO: 0.23 K/UL (ref 0–0.51)
EOSINOPHIL NFR BLD: 2.3 % (ref 0–6.9)
ERYTHROCYTE [DISTWIDTH] IN BLOOD BY AUTOMATED COUNT: 41.7 FL (ref 35.9–50)
GLOBULIN SER CALC-MCNC: 3 G/DL (ref 1.9–3.5)
GLUCOSE SERPL-MCNC: 108 MG/DL (ref 65–99)
HCT VFR BLD AUTO: 45.2 % (ref 42–52)
HGB BLD-MCNC: 15.3 G/DL (ref 14–18)
IMM GRANULOCYTES # BLD AUTO: 0.06 K/UL (ref 0–0.11)
IMM GRANULOCYTES NFR BLD AUTO: 0.6 % (ref 0–0.9)
LYMPHOCYTES # BLD AUTO: 2.11 K/UL (ref 1–4.8)
LYMPHOCYTES NFR BLD: 21.1 % (ref 22–41)
MAGNESIUM SERPL-MCNC: 2 MG/DL (ref 1.5–2.5)
MCH RBC QN AUTO: 29.7 PG (ref 27–33)
MCHC RBC AUTO-ENTMCNC: 33.8 G/DL (ref 33.7–35.3)
MCV RBC AUTO: 87.6 FL (ref 81.4–97.8)
MONOCYTES # BLD AUTO: 0.99 K/UL (ref 0–0.85)
MONOCYTES NFR BLD AUTO: 9.9 % (ref 0–13.4)
NEUTROPHILS # BLD AUTO: 6.51 K/UL (ref 1.82–7.42)
NEUTROPHILS NFR BLD: 65.1 % (ref 44–72)
NRBC # BLD AUTO: 0 K/UL
NRBC BLD-RTO: 0 /100 WBC
PHOSPHATE SERPL-MCNC: 4.2 MG/DL (ref 2.5–4.5)
PLATELET # BLD AUTO: 288 K/UL (ref 164–446)
PMV BLD AUTO: 8.7 FL (ref 9–12.9)
POTASSIUM SERPL-SCNC: 4.6 MMOL/L (ref 3.6–5.5)
PROT SERPL-MCNC: 7.6 G/DL (ref 6–8.2)
RBC # BLD AUTO: 5.16 M/UL (ref 4.7–6.1)
SODIUM SERPL-SCNC: 134 MMOL/L (ref 135–145)
WBC # BLD AUTO: 10 K/UL (ref 4.8–10.8)

## 2021-02-22 PROCEDURE — 700111 HCHG RX REV CODE 636 W/ 250 OVERRIDE (IP): Performed by: INTERNAL MEDICINE

## 2021-02-22 PROCEDURE — A9270 NON-COVERED ITEM OR SERVICE: HCPCS | Performed by: PSYCHIATRY & NEUROLOGY

## 2021-02-22 PROCEDURE — 700102 HCHG RX REV CODE 250 W/ 637 OVERRIDE(OP): Performed by: HOSPITALIST

## 2021-02-22 PROCEDURE — A9270 NON-COVERED ITEM OR SERVICE: HCPCS | Performed by: HOSPITALIST

## 2021-02-22 PROCEDURE — 770001 HCHG ROOM/CARE - MED/SURG/GYN PRIV*

## 2021-02-22 PROCEDURE — 99232 SBSQ HOSP IP/OBS MODERATE 35: CPT | Performed by: HOSPITALIST

## 2021-02-22 PROCEDURE — 84100 ASSAY OF PHOSPHORUS: CPT

## 2021-02-22 PROCEDURE — 700102 HCHG RX REV CODE 250 W/ 637 OVERRIDE(OP): Performed by: PSYCHIATRY & NEUROLOGY

## 2021-02-22 PROCEDURE — 80053 COMPREHEN METABOLIC PANEL: CPT

## 2021-02-22 PROCEDURE — 85025 COMPLETE CBC W/AUTO DIFF WBC: CPT

## 2021-02-22 PROCEDURE — 99232 SBSQ HOSP IP/OBS MODERATE 35: CPT | Performed by: PSYCHIATRY & NEUROLOGY

## 2021-02-22 PROCEDURE — 83735 ASSAY OF MAGNESIUM: CPT

## 2021-02-22 RX ADMIN — LIDOCAINE HYDROCHLORIDE 5 ML: 20 SOLUTION OROPHARYNGEAL at 15:51

## 2021-02-22 RX ADMIN — LORAZEPAM 0.5 MG: 0.5 TABLET ORAL at 11:46

## 2021-02-22 RX ADMIN — ENOXAPARIN SODIUM 40 MG: 40 INJECTION SUBCUTANEOUS at 05:32

## 2021-02-22 RX ADMIN — ESCITALOPRAM OXALATE 10 MG: 10 TABLET ORAL at 05:31

## 2021-02-22 RX ADMIN — FAMOTIDINE 20 MG: 20 TABLET ORAL at 05:31

## 2021-02-22 RX ADMIN — FAMOTIDINE 20 MG: 20 TABLET ORAL at 17:38

## 2021-02-22 RX ADMIN — ATORVASTATIN CALCIUM 20 MG: 20 TABLET, FILM COATED ORAL at 05:31

## 2021-02-22 RX ADMIN — LORAZEPAM 0.5 MG: 0.5 TABLET ORAL at 05:31

## 2021-02-22 RX ADMIN — OLANZAPINE 10 MG: 5 TABLET, ORALLY DISINTEGRATING ORAL at 19:10

## 2021-02-22 RX ADMIN — LOSARTAN POTASSIUM 100 MG: 50 TABLET, FILM COATED ORAL at 05:31

## 2021-02-22 RX ADMIN — LIDOCAINE HYDROCHLORIDE 5 ML: 20 SOLUTION OROPHARYNGEAL at 10:00

## 2021-02-22 RX ADMIN — LORAZEPAM 0.5 MG: 0.5 TABLET ORAL at 17:38

## 2021-02-22 ASSESSMENT — ENCOUNTER SYMPTOMS
HALLUCINATIONS: 1
DOUBLE VISION: 0
EYES NEGATIVE: 1
HEADACHES: 1
RESPIRATORY NEGATIVE: 1
MYALGIAS: 0
BLURRED VISION: 0
NEUROLOGICAL NEGATIVE: 1
MUSCULOSKELETAL NEGATIVE: 1
INSOMNIA: 0
CARDIOVASCULAR NEGATIVE: 1
ABDOMINAL PAIN: 1
GASTROINTESTINAL NEGATIVE: 1
TREMORS: 1
SHORTNESS OF BREATH: 0
COUGH: 0
DEPRESSION: 0
SORE THROAT: 0
NERVOUS/ANXIOUS: 1

## 2021-02-22 ASSESSMENT — PAIN DESCRIPTION - PAIN TYPE
TYPE: ACUTE PAIN

## 2021-02-22 NOTE — PROGRESS NOTES
[facility] Medicine Daily Progress Note    Date of Service  2/22/2021    Chief Complaint  50 y.o. male admitted 2/17/2021 with history of schizophrenia, ADHD admitted from an outlying hospital in [facility] secondary to drug overdose and suicidal attempt.    Hospital Course  50-year-old male with a history of schizophrenia, otherwise unspecified mental disorder admitted with probably medication overdose and suicidal attempt.  The patient initially found hemodynamically unstable and required vasopressors and intubation to protect airway.  The patient admitted to ICU with the critical care team.  Psychiatry consulted.    Interval Problem Update  Patient seen and examined today. ICU Care  Care and plan discussed in IDT/Hot rounds.  Lines and assistive devices reviewed.    Patient tolerating treatment and therapies.  All Data, Medication data reviewed.  Case discussed with nursing as available.  Plan of Care reviewed with patient and notified of changes.  2/18 patient successfully extubated, admitted to psychiatry team that he overdosed on large amount of pills, stating that he took half bottle of everything.  Reportedly the patient is chronically on escitalopram, Vistaril, oxcarbazepine, atorvastatin, losartan, mirtazapine, olanzapine.  Today after extubation patient is alert and oriented 2-3, following commands, sinus rhythm in the 70s, blood pressure in the 80s to 110s, norepinephrine is titrated off, urine output is adequate, afebrile, white cell count 13.2.  The patient is placed on legal hold  2/19 the patient feels better, he continues to worry about the thoughts in his head's, he states there are Rushing, flighting, he is eating well, he denies current pain, slight sore throat, psychiatry follow-up pending, continue legal hold  2/20 the patient overall stabilizing, he drinks lots of coffee, is trying to hydrate, some mild abdominal complaints but vague, still hearing voices  2/21 the patient complaining of  some tooth pain, gum pain, poor dentition noted, updated psychiatry in terms of medication needs.  Patient continues to have auditory hallucinations, anxiety, extremely restless  Consultants/Specialty  Pulmonary critical care  Psychiatry  Code Status  Full Code    Disposition  After stabilization will need to be transferred to a inpatient psychiatric hospital    Review of Systems  Review of Systems   Constitutional: Positive for malaise/fatigue.   HENT: Negative.         Mouth/teeth pain   Eyes: Negative.    Respiratory: Negative.    Cardiovascular: Negative.    Gastrointestinal: Negative.    Genitourinary: Negative.    Musculoskeletal: Negative.    Skin: Negative.    Neurological: Negative.    Endo/Heme/Allergies: Negative.    Psychiatric/Behavioral: Positive for hallucinations. Negative for suicidal ideas. The patient is nervous/anxious.    All other systems reviewed and are negative.       Physical Exam  Temp:  [36.4 °C (97.5 °F)-36.9 °C (98.4 °F)] 36.4 °C (97.5 °F)  Pulse:  [62-91] 87  Resp:  [16-22] 18  BP: (111-159)/(69-93) 111/78  SpO2:  [95 %-98 %] 97 %    Physical Exam  Vitals and nursing note reviewed.   Constitutional:       Appearance: He is well-developed.      Comments: Pt seen and examined.   HENT:      Head: Normocephalic and atraumatic.      Mouth/Throat:      Comments: Poor dentition  Eyes:      Pupils: Pupils are equal, round, and reactive to light.   Cardiovascular:      Rate and Rhythm: Normal rate.      Heart sounds: Normal heart sounds.   Pulmonary:      Effort: Pulmonary effort is normal.      Breath sounds: Normal breath sounds.   Abdominal:      General: Bowel sounds are normal.      Palpations: Abdomen is soft.   Genitourinary:     Penis: Normal.    Musculoskeletal:         General: Normal range of motion.      Cervical back: Normal range of motion and neck supple.   Skin:     General: Skin is warm and dry.   Neurological:      Mental Status: He is alert and oriented to person, place, and  time.      Coordination: Coordination abnormal.   Psychiatric:         Attention and Perception: He perceives auditory hallucinations.         Mood and Affect: Mood is anxious. Affect is labile.         Speech: Speech is rapid and pressured.         Behavior: Behavior is hyperactive.         Thought Content: Thought content is paranoid and delusional.         Cognition and Memory: Cognition is impaired.         Fluids    Intake/Output Summary (Last 24 hours) at 2/22/2021 1326  Last data filed at 2/22/2021 0800  Gross per 24 hour   Intake 580 ml   Output --   Net 580 ml       Laboratory  Recent Labs     02/20/21  0320 02/21/21  1003 02/22/21  0315   WBC 9.1 9.2 10.0   RBC 4.03* 5.03 5.16   HEMOGLOBIN 11.9* 15.1 15.3   HEMATOCRIT 35.5* 44.7 45.2   MCV 88.1 88.9 87.6   MCH 29.5 30.0 29.7   MCHC 33.5* 33.8 33.8   RDW 42.3 41.8 41.7   PLATELETCT 193 251 288   MPV 8.6* 8.7* 8.7*     Recent Labs     02/20/21  0320 02/21/21  1003 02/22/21  0315   SODIUM 125* 130* 134*   POTASSIUM 4.2 4.8 4.6   CHLORIDE 92* 94* 101   CO2 22 25 20   GLUCOSE 92 148* 108*   BUN 12 10 18   CREATININE 0.79 0.85 0.91   CALCIUM 9.0 10.3 10.5                   Imaging  DX-CHEST-PORTABLE (1 VIEW)   Final Result      1.  Well-positioned right IJ central catheter and ETT. No pneumothorax.   2.  NGT tip is in the distal esophagus. Recommend advancing 10 cm.   3.  Lungs are clear.           Assessment/Plan  Schizophrenia (HCC)- (present on admission)  Assessment & Plan  Per psychiatry evaluation, reinitiate medication regimen once appropriate    Suicidal behavior with attempted self-injury (HCC)- (present on admission)  Assessment & Plan  Legal hold, psychiatry consulting    Endotracheally intubated  Assessment & Plan  Patient transferred [facility] intubated due to drug overdose  Critical care consulted, ICU admission  Patient successfully extubated, monitor    Acute renal failure (ARF) (HCC)- (present on admission)  Assessment &  Plan  Patient's creatinine 1.23 unclear on baseline  Monitor urine output  IV fluids  Avoid nephrotoxins    Drug overdose- (present on admission)  Assessment & Plan  Patient report by EDP and  patient had intentionally overdosed  Suicidal behavior  Legal hold initiated, psychiatry consulting  Monitor for possible effects from polydrug overdose    Poor dentition- (present on admission)  Assessment & Plan  Needs semiurgent dental extractions    Leukocytosis- (present on admission)  Assessment & Plan  Patient WBC 15.6  Concern for aspiration pneumonia  Monitor clinically,     Plan  Magic mouthwash as needed for gum pain, will need tooth extractions as soon as possible  Monitor electrolytes, monitor respiratory status, all improved  Psychiatry evaluation requested for medication initiation  Legal hold extended, inpatient psychiatric transfer pending  Follow labs closely  See orders  Medically complex high risk patient    VTE prophylaxis: Lovenox  I have performed a physical exam and reviewed and updated ROS and Plan today . In review of yesterday's note , there are no changes except as documented above.        Please note that this dictation was created using voice recognition software. I have made every reasonable attempt to correct obvious errors, but I expect that there are errors of grammar and possibly context that I did not discover before finalizing the note.    This patient was seen under COVID 19 pandemic disaster response conditions.  During a disaster, the provisions of care is subject to the Crisis Standard of Care

## 2021-02-22 NOTE — CARE PLAN
Problem: Psychosocial Needs:  Goal: Level of anxiety will decrease  Outcome: PROGRESSING AS EXPECTED     Problem: Safety  Goal: Will remain free from injury  Outcome: PROGRESSING AS EXPECTED  Goal: Will remain free from falls  Outcome: PROGRESSING AS EXPECTED     Problem: Communication  Goal: The ability to communicate needs accurately and effectively will improve  Outcome: PROGRESSING AS EXPECTED

## 2021-02-22 NOTE — CONSULTS
"PSYCHIATRIC FOLLOW-UP:(established)  *Reason for admission:   Suicide attempt by overdose on pills     *Legal Hold Status on Admission:   On legal hold         Chart reviewed.         *HPI: Patient 2 days feels that his mood is \" fair\".  He denied any depressed mood or active or passive suicidal thoughts.  He has not had any suicidal thoughts since his suicide attempt.  Patient also says that the voices have become more like a whisper at this time.  They prevent him from falling asleep, but overall feels that they are improving.  Currently denies any other psychotic symptoms.  Patient's appetite is fair.  His anxiety is improving.  He feels tired overall.  No side effects to his current medications.  Patient states that he would like to go home and multiple times asked when his legal hold will be discontinued.  I explained at this time will monitor patient for response to treatment and maintenance of stabilization.  I also explained that once he is medically cleared that he can contest on a legal hold, however he stated that he is not trying to leave before he psychiatrically cleared.  Patient states that his goals for this year is to exercise more, obtain a hobby and have his auditory hallucinations stopped.  He also would like to learn how to talk to someone before acting on his own thoughts.  Stated that he could reach his mom, daughter or a psychiatrist in the future.           Medical ROS (as pertinent):     Review of Systems   Constitutional: Positive for malaise/fatigue.   HENT: Negative for sore throat.    Eyes: Negative for blurred vision and double vision.   Respiratory: Negative for cough and shortness of breath.    Cardiovascular: Negative for chest pain.   Gastrointestinal: Positive for abdominal pain.   Genitourinary: Negative for dysuria.   Musculoskeletal: Negative for myalgias.   Skin: Negative for rash.   Neurological: Positive for tremors and headaches.   Psychiatric/Behavioral: Positive for " "hallucinations. Negative for depression and suicidal ideas. The patient is nervous/anxious. The patient does not have insomnia.            *Psychiatric Examination:  Vitals:   Vitals:    02/22/21 1100   BP: 111/78   Pulse: 87   Resp:    Temp: 36.4 °C (97.5 °F)   SpO2: 97%       General Appearance: Patient appears older than his stated age.  Fair grooming and hygiene, lying in bed.  Calm and cooperative with fair eye contact  Abnormal Movements: Tremors and stiffness, stated that these are chronic and were present prior to his overdose  Gait and Posture: Normal lying bed  Speech: Normal volume, tone and rhythm  Thought processes: Linear  Associations: No loose associations  Abnormal or Psychotic Thoughts: Auditory hallucinations improving, no delusions or paranoia elicited  Judgement and Insight: Fair/fair  Orientation: Grossly oriented  Recent and Remote Memory: Intact  Attention Span and Concentration: Intact  Language: Fluid  Fund of Knowledge: Not tested  Mood and Affect:\" Fair\", constricted  SI/HI: Denies any passive or active SI/HI        *EKG:        *Labs personally reviewed: CMP and CBC, phosphorus and magnesium    Assessment: Psychosis and mood improving.  Currently denies any active or passive SI.      Dx:  Chronic schizophrenia, unstable  History of ADHD  History of bipolar disorder    Medical:  Hyponatremia  Acute renal failure  Leukocytosis, resolved  Poor dentition    Plan:  1- Legal hold: Extended  2- Psychotropic medications: Continue Lexapro 10 mg p.o. daily to target depression.  Monitor for SS.  Continue Zyprexa 10 mg p.o. daily to target psychosis.  Monitor for NMS  Continue Ativan 0.5 mg p.o. 3 times daily for anxiety.  3- Please transfer pt to inpatient psychiatric hospital when bed medically cleared and is available  4-patient would still benefit from inpatient psychotherapy  5- Psychiatry will follow up.    Thank you for the consult.     Sitter: Yes  Phone: Yes  Visitors yes  Personal " belongings: Yes

## 2021-02-22 NOTE — PROGRESS NOTES
Received report from previous shift RN and assumed care of patient at 0400 as a transfer pt to room 605.  Pt is resting/sleeping comfortably without complaint at this time with .  1:1 sitter in room.  Call bell in reach.  Patient shift assessment as written in flowsheet.  Ongoing care will be provided per orders and plan of care with education and reinforcement of same.

## 2021-02-23 LAB
BASOPHILS # BLD AUTO: 1.3 % (ref 0–1.8)
BASOPHILS # BLD: 0.12 K/UL (ref 0–0.12)
EOSINOPHIL # BLD AUTO: 0.24 K/UL (ref 0–0.51)
EOSINOPHIL NFR BLD: 2.6 % (ref 0–6.9)
ERYTHROCYTE [DISTWIDTH] IN BLOOD BY AUTOMATED COUNT: 42.7 FL (ref 35.9–50)
HCT VFR BLD AUTO: 44.5 % (ref 42–52)
HGB BLD-MCNC: 15.2 G/DL (ref 14–18)
IMM GRANULOCYTES # BLD AUTO: 0.04 K/UL (ref 0–0.11)
IMM GRANULOCYTES NFR BLD AUTO: 0.4 % (ref 0–0.9)
LYMPHOCYTES # BLD AUTO: 2.48 K/UL (ref 1–4.8)
LYMPHOCYTES NFR BLD: 27 % (ref 22–41)
MCH RBC QN AUTO: 30.2 PG (ref 27–33)
MCHC RBC AUTO-ENTMCNC: 34.2 G/DL (ref 33.7–35.3)
MCV RBC AUTO: 88.3 FL (ref 81.4–97.8)
MONOCYTES # BLD AUTO: 0.85 K/UL (ref 0–0.85)
MONOCYTES NFR BLD AUTO: 9.2 % (ref 0–13.4)
NEUTROPHILS # BLD AUTO: 5.46 K/UL (ref 1.82–7.42)
NEUTROPHILS NFR BLD: 59.5 % (ref 44–72)
NRBC # BLD AUTO: 0 K/UL
NRBC BLD-RTO: 0 /100 WBC
PLATELET # BLD AUTO: 267 K/UL (ref 164–446)
PMV BLD AUTO: 8.4 FL (ref 9–12.9)
RBC # BLD AUTO: 5.04 M/UL (ref 4.7–6.1)
WBC # BLD AUTO: 9.2 K/UL (ref 4.8–10.8)

## 2021-02-23 PROCEDURE — 700102 HCHG RX REV CODE 250 W/ 637 OVERRIDE(OP): Performed by: HOSPITALIST

## 2021-02-23 PROCEDURE — 700111 HCHG RX REV CODE 636 W/ 250 OVERRIDE (IP): Performed by: INTERNAL MEDICINE

## 2021-02-23 PROCEDURE — 700102 HCHG RX REV CODE 250 W/ 637 OVERRIDE(OP): Performed by: PSYCHIATRY & NEUROLOGY

## 2021-02-23 PROCEDURE — A9270 NON-COVERED ITEM OR SERVICE: HCPCS | Performed by: HOSPITALIST

## 2021-02-23 PROCEDURE — 99231 SBSQ HOSP IP/OBS SF/LOW 25: CPT | Performed by: HOSPITALIST

## 2021-02-23 PROCEDURE — 770001 HCHG ROOM/CARE - MED/SURG/GYN PRIV*

## 2021-02-23 PROCEDURE — A9270 NON-COVERED ITEM OR SERVICE: HCPCS | Performed by: PSYCHIATRY & NEUROLOGY

## 2021-02-23 PROCEDURE — 85025 COMPLETE CBC W/AUTO DIFF WBC: CPT

## 2021-02-23 RX ADMIN — FAMOTIDINE 20 MG: 20 TABLET ORAL at 05:05

## 2021-02-23 RX ADMIN — LORAZEPAM 0.5 MG: 0.5 TABLET ORAL at 05:04

## 2021-02-23 RX ADMIN — OLANZAPINE 10 MG: 5 TABLET, ORALLY DISINTEGRATING ORAL at 17:30

## 2021-02-23 RX ADMIN — FAMOTIDINE 20 MG: 20 TABLET ORAL at 17:30

## 2021-02-23 RX ADMIN — LORAZEPAM 0.5 MG: 0.5 TABLET ORAL at 17:30

## 2021-02-23 RX ADMIN — LIDOCAINE HYDROCHLORIDE 5 ML: 20 SOLUTION OROPHARYNGEAL at 06:59

## 2021-02-23 RX ADMIN — BENZOCAINE AND MENTHOL, UNSPECIFIED FORM 1 LOZENGE: 15; 3.6 LOZENGE ORAL at 17:30

## 2021-02-23 RX ADMIN — ESCITALOPRAM OXALATE 10 MG: 10 TABLET ORAL at 05:05

## 2021-02-23 RX ADMIN — ENOXAPARIN SODIUM 40 MG: 40 INJECTION SUBCUTANEOUS at 05:05

## 2021-02-23 RX ADMIN — LIDOCAINE HYDROCHLORIDE 5 ML: 20 SOLUTION OROPHARYNGEAL at 14:53

## 2021-02-23 RX ADMIN — LORAZEPAM 0.5 MG: 0.5 TABLET ORAL at 11:05

## 2021-02-23 RX ADMIN — LOSARTAN POTASSIUM 100 MG: 50 TABLET, FILM COATED ORAL at 05:04

## 2021-02-23 RX ADMIN — ATORVASTATIN CALCIUM 20 MG: 20 TABLET, FILM COATED ORAL at 05:05

## 2021-02-23 ASSESSMENT — FIBROSIS 4 INDEX: FIB4 SCORE: 0.6

## 2021-02-23 ASSESSMENT — PAIN DESCRIPTION - PAIN TYPE
TYPE: ACUTE PAIN

## 2021-02-23 ASSESSMENT — ENCOUNTER SYMPTOMS
SORE THROAT: 0
CHILLS: 0
ABDOMINAL PAIN: 0
DIZZINESS: 0
FEVER: 0
BLURRED VISION: 0
DIARRHEA: 0
NAUSEA: 0
COUGH: 0
HEADACHES: 0
DOUBLE VISION: 0
LOSS OF CONSCIOUSNESS: 0
VOMITING: 0
BACK PAIN: 0
PALPITATIONS: 0
SHORTNESS OF BREATH: 0

## 2021-02-23 NOTE — PROGRESS NOTES
"Hospital Medicine Daily Progress Note    Date of Service  2/23/2021    Chief Complaint  50 y.o. male admitted 2/17/2021 with medication OD    Hospital Course  PMHx ADD, ashtma, bipolar, deaf, HTN.  Pt presented to an outlying facility after an OD of his home meds, details unclear.  Was intubated on presentation for airway protection.  Extubated 2/18    Interval Problem Update  Pt is in a good mood, states he feels \"pretty good\".  Looking forward to his Mom visisting later this am    Consultants/Specialty  Psych    Code Status  Full Code    Disposition  In pt Psych bed    Review of Systems  Review of Systems   Constitutional: Negative for chills and fever.   HENT: Negative for nosebleeds and sore throat.    Eyes: Negative for blurred vision and double vision.   Respiratory: Negative for cough and shortness of breath.    Cardiovascular: Negative for chest pain, palpitations and leg swelling.   Gastrointestinal: Negative for abdominal pain, diarrhea, nausea and vomiting.   Genitourinary: Negative for dysuria and urgency.   Musculoskeletal: Negative for back pain.   Skin: Negative for rash.   Neurological: Negative for dizziness, loss of consciousness and headaches.        Physical Exam  Temp:  [36.3 °C (97.3 °F)-36.8 °C (98.3 °F)] 36.7 °C (98.1 °F)  Pulse:  [80-99] 99  Resp:  [16-18] 16  BP: (111-136)/(74-78) 132/78  SpO2:  [94 %-97 %] 95 %    Physical Exam  Vitals reviewed.   Constitutional:       General: He is not in acute distress.     Appearance: He is well-developed. He is not diaphoretic.   HENT:      Head: Normocephalic and atraumatic.   Eyes:      Conjunctiva/sclera: Conjunctivae normal.   Neck:      Vascular: No JVD.   Cardiovascular:      Rate and Rhythm: Normal rate.      Heart sounds: No murmur. No gallop.    Pulmonary:      Effort: Pulmonary effort is normal. No respiratory distress.      Breath sounds: No stridor. No wheezing or rales.   Abdominal:      Palpations: Abdomen is soft.      Tenderness: There " is no abdominal tenderness. There is no guarding or rebound.   Skin:     General: Skin is warm and dry.      Findings: No rash.   Neurological:      Mental Status: He is oriented to person, place, and time.   Psychiatric:         Thought Content: Thought content normal.      Comments: Good mood is in appropriate given pt's presentation         Fluids    Intake/Output Summary (Last 24 hours) at 2/23/2021 0609  Last data filed at 2/22/2021 1300  Gross per 24 hour   Intake 480 ml   Output --   Net 480 ml       Laboratory  Recent Labs     02/21/21  1003 02/22/21  0315 02/23/21  0515   WBC 9.2 10.0 9.2   RBC 5.03 5.16 5.04   HEMOGLOBIN 15.1 15.3 15.2   HEMATOCRIT 44.7 45.2 44.5   MCV 88.9 87.6 88.3   MCH 30.0 29.7 30.2   MCHC 33.8 33.8 34.2   RDW 41.8 41.7 42.7   PLATELETCT 251 288 267   MPV 8.7* 8.7* 8.4*     Recent Labs     02/21/21  1003 02/22/21  0315   SODIUM 130* 134*   POTASSIUM 4.8 4.6   CHLORIDE 94* 101   CO2 25 20   GLUCOSE 148* 108*   BUN 10 18   CREATININE 0.85 0.91   CALCIUM 10.3 10.5                   Imaging  DX-CHEST-PORTABLE (1 VIEW)   Final Result      1.  Well-positioned right IJ central catheter and ETT. No pneumothorax.   2.  NGT tip is in the distal esophagus. Recommend advancing 10 cm.   3.  Lungs are clear.           Assessment/Plan  * Acute respiratory failure with hypoxia (HCC)  Assessment & Plan  Intubated for airway protection  Now extubated an on RA doing well    Hypotension due to hypovolemia- (present on admission)  Assessment & Plan  resolved    Schizophrenia (HCC)- (present on admission)  Assessment & Plan  Per psychiatry evaluation, reinitiate medication regimen once appropriate    Suicidal behavior with attempted self-injury (HCC)- (present on admission)  Assessment & Plan  Legal hold, psychiatry consulting    Endotracheally intubated  Assessment & Plan  Patient transferred [facility] intubated due to drug overdose  Critical care consulted, ICU admission  Patient  successfully extubated, monitor    Acute renal failure (ARF) ([facility])- (present on admission)  Assessment & Plan  Patient's creatinine 1.23 on presentation  Has now improved to normal range after IVFs    Drug overdose- (present on admission)  Assessment & Plan  Patient report by EDP and  patient had intentionally overdosed  Suicidal behavior  Legal hold initiated, psychiatry consulting  Monitor for possible effects from polydrug overdose    Poor dentition- (present on admission)  Assessment & Plan  Needs semiurgent dental extractions    Leukocytosis- (present on admission)  Assessment & Plan  Patient WBC 15.6  Concern for aspiration pneumonia  Monitor clinically,       VTE prophylaxis: ambulatory

## 2021-02-23 NOTE — PROGRESS NOTES
Assumed care of patient at bedside report from DAY RN. Updated on POC. Patient currently A & O x 4; on O2 on RA; up with SBA; without complaints of acute pain. Sitter at bedside. Call light within reach. Whiteboard updated. Fall precautions in place. Bed locked and in lowest position. All questions answered. No other needs indicated at this time.

## 2021-02-23 NOTE — CARE PLAN
Problem: Safety  Goal: Will remain free from injury  Outcome: PROGRESSING AS EXPECTED  Goal: Will remain free from falls  Outcome: PROGRESSING AS EXPECTED  Note: Falls precaution in place, Educated pt about calling for assistance, sitter at bedside, address patient's needs frequently thru hourly rounding     Problem: Venous Thromboembolism (VTW)/Deep Vein Thrombosis (DVT) Prevention:  Goal: Patient will participate in Venous Thrombosis (VTE)/Deep Vein Thrombosis (DVT)Prevention Measures  Outcome: PROGRESSING AS EXPECTED     Problem: Bowel/Gastric:  Goal: Normal bowel function is maintained or improved  Outcome: PROGRESSING AS EXPECTED  Note: No complaints of bowel issues at this time     Problem: Knowledge Deficit  Goal: Knowledge of disease process/condition, treatment plan, diagnostic tests, and medications will improve  Outcome: PROGRESSING AS EXPECTED  Goal: Knowledge of the prescribed therapeutic regimen will improve  Outcome: PROGRESSING AS EXPECTED  Note: Updated patient about plan of care     Problem: Respiratory:  Goal: Respiratory status will improve  Outcome: PROGRESSING AS EXPECTED  Note: Patient is on room air, educated about IS and performed correctly.      Problem: Skin Integrity  Goal: Risk for impaired skin integrity will decrease  Outcome: PROGRESSING AS EXPECTED  Note: Patient able to turn and ambulate independently, no new skin issues noted     Problem: Pain Management  Goal: Pain level will decrease to patient's comfort goal  Outcome: PROGRESSING AS EXPECTED  Note: Assess pain frequently, administer medication as needed     Problem: Psychosocial Needs:  Goal: Level of anxiety will decrease  Outcome: PROGRESSING AS EXPECTED  Note: Patient denies anxiety at this time. Patient calm and cooperative. Encouraged patient to voice concerns. Sitter at bedside for constant monitor.

## 2021-02-24 ENCOUNTER — HOSPITAL ENCOUNTER (INPATIENT)
Dept: HOSPITAL 8 - 3E | Age: 51
LOS: 7 days | Discharge: HOME | DRG: 885 | End: 2021-03-03
Attending: PSYCHIATRY & NEUROLOGY | Admitting: PSYCHIATRY & NEUROLOGY
Payer: MEDICARE

## 2021-02-24 VITALS
WEIGHT: 160.94 LBS | HEART RATE: 79 BPM | TEMPERATURE: 97.9 F | OXYGEN SATURATION: 98 % | DIASTOLIC BLOOD PRESSURE: 73 MMHG | HEIGHT: 65 IN | RESPIRATION RATE: 19 BRPM | SYSTOLIC BLOOD PRESSURE: 113 MMHG | BODY MASS INDEX: 26.81 KG/M2

## 2021-02-24 VITALS — HEIGHT: 68 IN | BODY MASS INDEX: 26.06 KG/M2 | WEIGHT: 171.96 LBS

## 2021-02-24 VITALS — DIASTOLIC BLOOD PRESSURE: 95 MMHG | SYSTOLIC BLOOD PRESSURE: 147 MMHG

## 2021-02-24 DIAGNOSIS — Z79.899: ICD-10-CM

## 2021-02-24 DIAGNOSIS — Z56.0: ICD-10-CM

## 2021-02-24 DIAGNOSIS — Z66: ICD-10-CM

## 2021-02-24 DIAGNOSIS — J45.909: ICD-10-CM

## 2021-02-24 DIAGNOSIS — F12.10: ICD-10-CM

## 2021-02-24 DIAGNOSIS — M54.5: ICD-10-CM

## 2021-02-24 DIAGNOSIS — I10: ICD-10-CM

## 2021-02-24 DIAGNOSIS — F90.9: ICD-10-CM

## 2021-02-24 DIAGNOSIS — E78.5: ICD-10-CM

## 2021-02-24 DIAGNOSIS — F17.210: ICD-10-CM

## 2021-02-24 DIAGNOSIS — F25.0: Primary | ICD-10-CM

## 2021-02-24 DIAGNOSIS — F98.8: ICD-10-CM

## 2021-02-24 DIAGNOSIS — K08.89: ICD-10-CM

## 2021-02-24 DIAGNOSIS — G89.29: ICD-10-CM

## 2021-02-24 DIAGNOSIS — Z91.5: ICD-10-CM

## 2021-02-24 LAB
BASOPHILS # BLD AUTO: 1.5 % (ref 0–1.8)
BASOPHILS # BLD: 0.11 K/UL (ref 0–0.12)
CHOL/HDL RATIO: 3.5
EOSINOPHIL # BLD AUTO: 0.24 K/UL (ref 0–0.51)
EOSINOPHIL NFR BLD: 3.2 % (ref 0–6.9)
ERYTHROCYTE [DISTWIDTH] IN BLOOD BY AUTOMATED COUNT: 42.5 FL (ref 35.9–50)
HCT VFR BLD AUTO: 42.3 % (ref 42–52)
HDL CHOL %: 28 % (ref 26–37)
HDL CHOLESTEROL (DIRECT): 53 MG/DL (ref 40–60)
HGB BLD-MCNC: 14.3 G/DL (ref 14–18)
IMM GRANULOCYTES # BLD AUTO: 0.03 K/UL (ref 0–0.11)
IMM GRANULOCYTES NFR BLD AUTO: 0.4 % (ref 0–0.9)
LDL CHOLESTEROL,CALCULATED: 118 MG/DL (ref 54–169)
LDLC/HDLC SERPL: 2.2 {RATIO} (ref 0.5–3)
LYMPHOCYTES # BLD AUTO: 2.17 K/UL (ref 1–4.8)
LYMPHOCYTES NFR BLD: 28.9 % (ref 22–41)
MCH RBC QN AUTO: 30.3 PG (ref 27–33)
MCHC RBC AUTO-ENTMCNC: 33.8 G/DL (ref 33.7–35.3)
MCV RBC AUTO: 89.6 FL (ref 81.4–97.8)
MICROSCOPIC: (no result)
MONOCYTES # BLD AUTO: 0.81 K/UL (ref 0–0.85)
MONOCYTES NFR BLD AUTO: 10.8 % (ref 0–13.4)
NEUTROPHILS # BLD AUTO: 4.15 K/UL (ref 1.82–7.42)
NEUTROPHILS NFR BLD: 55.2 % (ref 44–72)
NRBC # BLD AUTO: 0 K/UL
NRBC BLD-RTO: 0 /100 WBC
PLATELET # BLD AUTO: 280 K/UL (ref 164–446)
PMV BLD AUTO: 8.7 FL (ref 9–12.9)
RBC # BLD AUTO: 4.72 M/UL (ref 4.7–6.1)
T4 FREE SERPL-MCNC: 0.91 NG/DL (ref 0.76–1.46)
TRIGL SERPL-MCNC: 79 MG/DL (ref 50–200)
VLDLC SERPL CALC-MCNC: 16 MG/DL (ref 0–25)
WBC # BLD AUTO: 7.5 K/UL (ref 4.8–10.8)

## 2021-02-24 PROCEDURE — 99239 HOSP IP/OBS DSCHRG MGMT >30: CPT | Performed by: HOSPITALIST

## 2021-02-24 PROCEDURE — 700111 HCHG RX REV CODE 636 W/ 250 OVERRIDE (IP): Performed by: INTERNAL MEDICINE

## 2021-02-24 PROCEDURE — 85025 COMPLETE CBC W/AUTO DIFF WBC: CPT

## 2021-02-24 PROCEDURE — A9270 NON-COVERED ITEM OR SERVICE: HCPCS | Performed by: HOSPITALIST

## 2021-02-24 PROCEDURE — 700102 HCHG RX REV CODE 250 W/ 637 OVERRIDE(OP): Performed by: HOSPITALIST

## 2021-02-24 PROCEDURE — 700102 HCHG RX REV CODE 250 W/ 637 OVERRIDE(OP): Performed by: PSYCHIATRY & NEUROLOGY

## 2021-02-24 PROCEDURE — 84443 ASSAY THYROID STIM HORMONE: CPT

## 2021-02-24 PROCEDURE — 82607 VITAMIN B-12: CPT

## 2021-02-24 PROCEDURE — 93005 ELECTROCARDIOGRAM TRACING: CPT

## 2021-02-24 PROCEDURE — 80061 LIPID PANEL: CPT

## 2021-02-24 PROCEDURE — 71045 X-RAY EXAM CHEST 1 VIEW: CPT

## 2021-02-24 PROCEDURE — 81003 URINALYSIS AUTO W/O SCOPE: CPT

## 2021-02-24 PROCEDURE — 99231 SBSQ HOSP IP/OBS SF/LOW 25: CPT | Performed by: PSYCHIATRY & NEUROLOGY

## 2021-02-24 PROCEDURE — 84439 ASSAY OF FREE THYROXINE: CPT

## 2021-02-24 PROCEDURE — A9270 NON-COVERED ITEM OR SERVICE: HCPCS | Performed by: PSYCHIATRY & NEUROLOGY

## 2021-02-24 PROCEDURE — 36415 COLL VENOUS BLD VENIPUNCTURE: CPT

## 2021-02-24 PROCEDURE — 80053 COMPREHEN METABOLIC PANEL: CPT

## 2021-02-24 RX ADMIN — ESCITALOPRAM OXALATE 10 MG: 10 TABLET ORAL at 05:16

## 2021-02-24 RX ADMIN — ENOXAPARIN SODIUM 40 MG: 40 INJECTION SUBCUTANEOUS at 05:16

## 2021-02-24 RX ADMIN — LOSARTAN POTASSIUM 100 MG: 50 TABLET, FILM COATED ORAL at 05:20

## 2021-02-24 RX ADMIN — LORAZEPAM 0.5 MG: 0.5 TABLET ORAL at 05:15

## 2021-02-24 RX ADMIN — LIDOCAINE HYDROCHLORIDE 5 ML: 20 SOLUTION OROPHARYNGEAL at 16:44

## 2021-02-24 RX ADMIN — LORAZEPAM 0.5 MG: 0.5 TABLET ORAL at 12:21

## 2021-02-24 RX ADMIN — LORAZEPAM 0.5 MG: 0.5 TABLET ORAL at 18:10

## 2021-02-24 RX ADMIN — OLANZAPINE 10 MG: 5 TABLET, ORALLY DISINTEGRATING ORAL at 18:09

## 2021-02-24 RX ADMIN — LIDOCAINE HYDROCHLORIDE 5 ML: 20 SOLUTION OROPHARYNGEAL at 05:21

## 2021-02-24 RX ADMIN — BENZOCAINE AND MENTHOL 1 LOZENGE: 15; 3.6 LOZENGE ORAL at 18:10

## 2021-02-24 RX ADMIN — ATORVASTATIN CALCIUM 20 MG: 20 TABLET, FILM COATED ORAL at 05:15

## 2021-02-24 RX ADMIN — FAMOTIDINE 20 MG: 20 TABLET ORAL at 05:16

## 2021-02-24 SDOH — ECONOMIC STABILITY - INCOME SECURITY: UNEMPLOYMENT, UNSPECIFIED: Z56.0

## 2021-02-24 ASSESSMENT — ENCOUNTER SYMPTOMS
COUGH: 0
BLURRED VISION: 0
SORE THROAT: 0
FEVER: 0
HEADACHES: 0
SORE THROAT: 1
INSOMNIA: 0
CONSTIPATION: 0
NAUSEA: 0
DIARRHEA: 0
DIZZINESS: 0
DEPRESSION: 0
PALPITATIONS: 0
SHORTNESS OF BREATH: 0
DOUBLE VISION: 0
MYALGIAS: 0
LOSS OF CONSCIOUSNESS: 0
CHILLS: 0
BACK PAIN: 0
NERVOUS/ANXIOUS: 0
VOMITING: 0
HALLUCINATIONS: 0
ABDOMINAL PAIN: 0

## 2021-02-24 ASSESSMENT — FIBROSIS 4 INDEX: FIB4 SCORE: 0.58

## 2021-02-24 NOTE — DISCHARGE PLANNING
Anticipated Discharge Disposition: inpatient psychiatric facility    Action: spoke w/ Dr. Gentile. Pt medically cleared. Medical clearance section of legal hold paperwork signed by Dr. Gentile. Requested DPA to send referral to inpatient psychiatric facilities. Legal hold extended 2/22    Barriers to Discharge:   Legal hold    Plan: TBD

## 2021-02-24 NOTE — CARE PLAN
Problem: Safety  Goal: Will remain free from injury  Note: Sitter at bedside at all times. Patient mobility assessed- no assistance required, steady. Treaded slipper socks in place      Problem: Knowledge Deficit  Goal: Knowledge of disease process/condition, treatment plan, diagnostic tests, and medications will improve  Note: Education given regarding plan of care, patient demonstrates understanding

## 2021-02-24 NOTE — CONSULTS
PSYCHIATRIC FOLLOW-UP:(established)  *Reason for admission: Suicide attempt by overdose on multiple pills      *Legal Hold Status on Admission: On hold          Chart reviewed.         *HPI:   Patient today reports feeling much better.  States that he feels that his face is glowing.  Denied hearing any voices or having paranoia since medications were started.  His sleep and appetite are good.  Denied any depressed mood or acute anxiety.  Denies any side effects to current medications.  Patient requested to go home, and stated that he will be able to continue psychiatric follow-up with his private psychiatrist.  Stated that his goals are to see his psychiatrist every 2 weeks, become less paranoid, learn to open up and find a hobby.  Patient today denies any active or passive SI/HI.  Stated that he has very good support from his daughter, mother and psychiatrist.         Medical ROS (as pertinent):     Review of Systems   Constitutional: Negative for malaise/fatigue.   HENT: Positive for sore throat.    Respiratory: Negative for shortness of breath.    Cardiovascular: Negative for chest pain.   Gastrointestinal: Negative for abdominal pain, constipation, diarrhea, nausea and vomiting.   Genitourinary: Negative for dysuria.   Musculoskeletal: Negative for myalgias.   Neurological: Negative for headaches.   Psychiatric/Behavioral: Negative for depression, hallucinations and suicidal ideas. The patient is not nervous/anxious and does not have insomnia.            *Psychiatric Examination:  Vitals:   Vitals:    02/24/21 1400   BP: 113/73   Pulse: 79   Resp: 19   Temp: 36.6 °C (97.9 °F)   SpO2: 98%     Appearance: appears stated age, fair grooming and hygiene, calm, cooperative, good eye contact  Abnormal movements: none  Gait/posture: normal  Speech: normal volume, tone and rhythm  Though process: linear and goal oriented  Associations: no loose associations  Thought content: denies AVH, no delusions or paranoia elicited,  "does not appear to be responding to internal stimuli, neither is internally preoccupied.   Judgement and Insight: fair/fair  Orientation: Grossly oriented  Recent and Remote Memory: intact  Attention Span and Concentration: intact  Language: fluid   Fund of Knowledge: not tested   Mood and Affect: \" I am feeling good today\", brighter  SI/HI:denies any active or passive SI/HI    *Labs personally rewed: CBC    Assessment: Patient's mood has significantly improved.  His affect is brighter today.  He is future oriented and currently denies any active or passive SI/HI.  He is not grossly psychotic.  Patient was admitted after suicide attempt in the context of psychosis.  Will continue legal hold for further observation of stabilization of mood and behavior.  Patient will greatly benefit from psychotherapy to improve/ensure coping skills.      Dx:  Chronic schizophrenia, unstable  History of ADHD  History of bipolar disorder     Medical:  Hyponatremia  Acute renal failure  Leukocytosis, resolved  Poor dentition    Plan:  1- Legal hold: Extended  2- Psychotropic medications: Continue current regimen  3- Please transfer pt to inpatient psychiatric hospital when medically cleared and bed is available  4- Psychiatry will follow up.     Thank you for the consult.       Sitter: Yes  Phone: Yes  Visitors: Yes  Personal belongings: Yes    This note was created using voice recognition software (Dragon). The accuracy of the dictation is limited by the abilities of the software. I have reviewed the note prior to signing. However, error related to voice recognition software and /or scribes may still exist and should be interpreted within the appropriate context.     "

## 2021-02-24 NOTE — PROGRESS NOTES
"Hospital Medicine Daily Progress Note    Date of Service  2/24/2021    Chief Complaint  50 y.o. male admitted 2/17/2021 with medication OD    Hospital Course  PMHx ADD, ashtma, bipolar, deaf, HTN.  Pt presented to an outlying facility after an OD of his home meds, details unclear.  Was intubated on presentation for airway protection.  Extubated 2/18    Interval Problem Update  Pt is in a good mood, states he feels \"good\".  No complaints  Felt he had a good meeting with the Psych team today    Consultants/Specialty  Psych    Code Status  Full Code    Disposition  In pt Psych bed    Review of Systems  Review of Systems   Constitutional: Negative for chills and fever.   HENT: Negative for nosebleeds and sore throat.    Eyes: Negative for blurred vision and double vision.   Respiratory: Negative for cough and shortness of breath.    Cardiovascular: Negative for chest pain, palpitations and leg swelling.   Gastrointestinal: Negative for abdominal pain, diarrhea, nausea and vomiting.   Genitourinary: Negative for dysuria and urgency.   Musculoskeletal: Negative for back pain.   Skin: Negative for rash.   Neurological: Negative for dizziness, loss of consciousness and headaches.        Physical Exam  Temp:  [36 °C (96.8 °F)-36.6 °C (97.9 °F)] 36.4 °C (97.6 °F)  Pulse:  [67-88] 81  Resp:  [16-18] 18  BP: (102-120)/(69-83) 116/83  SpO2:  [90 %-98 %] 98 %    Physical Exam  Vitals reviewed.   Constitutional:       General: He is not in acute distress.     Appearance: He is well-developed. He is not diaphoretic.   HENT:      Head: Normocephalic and atraumatic.   Eyes:      Conjunctiva/sclera: Conjunctivae normal.   Neck:      Vascular: No JVD.   Cardiovascular:      Rate and Rhythm: Normal rate.      Heart sounds: No murmur. No gallop.    Pulmonary:      Effort: Pulmonary effort is normal. No respiratory distress.      Breath sounds: No stridor. No wheezing or rales.   Abdominal:      Palpations: Abdomen is soft.      " Tenderness: There is no abdominal tenderness. There is no guarding or rebound.   Skin:     General: Skin is warm and dry.      Findings: No rash.   Neurological:      Mental Status: He is oriented to person, place, and time.   Psychiatric:         Thought Content: Thought content normal.      Comments: Good mood is in appropriate given pt's presentation         Fluids    Intake/Output Summary (Last 24 hours) at 2/24/2021 0614  Last data filed at 2/23/2021 1600  Gross per 24 hour   Intake 1080 ml   Output --   Net 1080 ml       Laboratory  Recent Labs     02/22/21  0315 02/23/21  0515 02/24/21  0537   WBC 10.0 9.2 7.5   RBC 5.16 5.04 4.72   HEMOGLOBIN 15.3 15.2 14.3   HEMATOCRIT 45.2 44.5 42.3   MCV 87.6 88.3 89.6   MCH 29.7 30.2 30.3   MCHC 33.8 34.2 33.8   RDW 41.7 42.7 42.5   PLATELETCT 288 267 280   MPV 8.7* 8.4* 8.7*     Recent Labs     02/21/21  1003 02/22/21  0315   SODIUM 130* 134*   POTASSIUM 4.8 4.6   CHLORIDE 94* 101   CO2 25 20   GLUCOSE 148* 108*   BUN 10 18   CREATININE 0.85 0.91   CALCIUM 10.3 10.5                   Imaging  DX-CHEST-PORTABLE (1 VIEW)   Final Result      1.  Well-positioned right IJ central catheter and ETT. No pneumothorax.   2.  NGT tip is in the distal esophagus. Recommend advancing 10 cm.   3.  Lungs are clear.           Assessment/Plan  * Acute respiratory failure with hypoxia (HCC)  Assessment & Plan  Intubated for airway protection  Now extubated an on RA doing well    Hypotension due to hypovolemia- (present on admission)  Assessment & Plan  resolved    Schizophrenia (HCC)- (present on admission)  Assessment & Plan  Per psychiatry evaluation, reinitiate medication regimen once appropriate    Suicidal behavior with attempted self-injury (HCC)- (present on admission)  Assessment & Plan  Legal hold, psychiatry consulting    Endotracheally intubated  Assessment & Plan  Patient transferred [facility] intubated due to drug overdose  Critical care consulted, ICU  admission  Patient successfully extubated, monitor    Acute renal failure (ARF) ([facility])- (present on admission)  Assessment & Plan  Patient's creatinine 1.23 on presentation  Has now improved to normal range after IVFs    Drug overdose- (present on admission)  Assessment & Plan  Patient report by EDP and  patient had intentionally overdosed  Suicidal behavior  Legal hold initiated, psychiatry consulting  Monitor for possible effects from polydrug overdose    Poor dentition- (present on admission)  Assessment & Plan  Needs semiurgent dental extractions    Leukocytosis- (present on admission)  Assessment & Plan  Patient WBC 15.6  Concern for aspiration pneumonia  Monitor clinically,       VTE prophylaxis: ambulatory

## 2021-02-25 VITALS — SYSTOLIC BLOOD PRESSURE: 126 MMHG | DIASTOLIC BLOOD PRESSURE: 73 MMHG

## 2021-02-25 VITALS — DIASTOLIC BLOOD PRESSURE: 70 MMHG | SYSTOLIC BLOOD PRESSURE: 108 MMHG

## 2021-02-25 LAB
ALBUMIN SERPL-MCNC: 3.8 G/DL (ref 3.4–5)
ALP SERPL-CCNC: 84 U/L (ref 45–117)
ALT SERPL-CCNC: 42 U/L (ref 12–78)
ANION GAP SERPL CALC-SCNC: 7 MMOL/L (ref 5–15)
BASOPHILS # BLD AUTO: 0.1 X10^3/UL (ref 0–0.1)
BASOPHILS NFR BLD AUTO: 2 % (ref 0–1)
BILIRUB SERPL-MCNC: 0.4 MG/DL (ref 0.2–1)
CALCIUM SERPL-MCNC: 9.4 MG/DL (ref 8.5–10.1)
CHLORIDE SERPL-SCNC: 108 MMOL/L (ref 98–107)
CREAT SERPL-MCNC: 1.08 MG/DL (ref 0.7–1.3)
EOSINOPHIL # BLD AUTO: 0.2 X10^3/UL (ref 0–0.4)
EOSINOPHIL NFR BLD AUTO: 3 % (ref 1–7)
ERYTHROCYTE [DISTWIDTH] IN BLOOD BY AUTOMATED COUNT: 13.5 % (ref 9.4–14.8)
LYMPHOCYTES # BLD AUTO: 2 X10^3/UL (ref 1–3.4)
LYMPHOCYTES NFR BLD AUTO: 30 % (ref 22–44)
MCH RBC QN AUTO: 30.1 PG (ref 27.5–34.5)
MCHC RBC AUTO-ENTMCNC: 33.5 G/DL (ref 33.2–36.2)
MD: NO
MONOCYTES # BLD AUTO: 0.7 X10^3/UL (ref 0.2–0.8)
MONOCYTES NFR BLD AUTO: 11 % (ref 2–9)
NEUTROPHILS # BLD AUTO: 3.5 X10^3/UL (ref 1.8–6.8)
NEUTROPHILS NFR BLD AUTO: 54 % (ref 42–75)
PLATELET # BLD AUTO: 294 X10^3/UL (ref 130–400)
PMV BLD AUTO: 6.5 FL (ref 7.4–10.4)
PROT SERPL-MCNC: 6.6 G/DL (ref 6.4–8.2)
RBC # BLD AUTO: 4.52 X10^6/UL (ref 4.38–5.82)

## 2021-02-25 RX ADMIN — LOSARTAN POTASSIUM SCH MG: 100 TABLET, FILM COATED ORAL at 10:36

## 2021-02-25 RX ADMIN — ESCITALOPRAM OXALATE SCH MG: 10 TABLET, FILM COATED ORAL at 10:35

## 2021-02-25 RX ADMIN — ATORVASTATIN CALCIUM SCH MG: 20 TABLET, FILM COATED ORAL at 20:20

## 2021-02-25 RX ADMIN — OXCARBAZEPINE SCH MG: 300 TABLET, FILM COATED ORAL at 10:35

## 2021-02-25 RX ADMIN — OXCARBAZEPINE SCH MG: 300 TABLET, FILM COATED ORAL at 20:20

## 2021-02-25 NOTE — PROGRESS NOTES
Report given to RN at accepting facility, all questions answered.     EMS arrived to transport patient, IV removed and all belongings given to EMS

## 2021-02-25 NOTE — DISCHARGE PLANNING
MTM contacted - No transportation benefits per SIG.  PCS completed and faxed to [facility]  Transportation time arranged for 1930    Transfer Packet completed with COBRA  Packet Taken to Nurses station.       RN updated on transfer and transfer time.

## 2021-02-25 NOTE — DISCHARGE INSTRUCTIONS
Discharge Instructions    Discharged to [facility] by ambulance with escort. Discharged via ambulance, hospital escort: Yes.  Special equipment needed: Not Applicable    Be sure to schedule a follow-up appointment with your primary care doctor or any specialists as instructed.     Discharge Plan:   Smoking Cessation Offered: Patient Counseled    I understand that a diet low in cholesterol, fat, and sodium is recommended for good health. Unless I have been given specific instructions below for another diet, I accept this instruction as my diet prescription.   Other diet: General    Special Instructions: None    · Is patient discharged on Warfarin / Coumadin?   No     Depression / Suicide Risk    As you are discharged from this [facility] facility, it is important to learn how to keep safe from harming yourself.    Recognize the warning signs:  · Abrupt changes in personality, positive or negative- including increase in energy   · Giving away possessions  · Change in eating patterns- significant weight changes-  positive or negative  · Change in sleeping patterns- unable to sleep or sleeping all the time   · Unwillingness or inability to communicate  · Depression  · Unusual sadness, discouragement and loneliness  · Talk of wanting to die  · Neglect of personal appearance   · Rebelliousness- reckless behavior  · Withdrawal from people/activities they love  · Confusion- inability to concentrate     If you or a loved one observes any of these behaviors or has concerns about self-harm, here's what you can do:  · Talk about it- your feelings and reasons for harming yourself  · Remove any means that you might use to hurt yourself (examples: pills, rope, extension cords, firearm)  · Get professional help from the community (Mental Health, Substance Abuse, psychological counseling)  · Do not be alone:Call your Safe Contact- someone whom you trust who will be there for you.  · Call your local  CRISIS HOTLINE 773-8742 or 348-338-2481  · Call your local Children's Mobile Crisis Response Team [place] (737) 147-1960 or www.[employer]  · Call the toll free National Suicide Prevention Hotlines   · National Suicide Prevention Lifeline 751-512-JRVR (3267)  · National [employer] Network 800-SUICIDE (818-5638)

## 2021-02-25 NOTE — DISCHARGE SUMMARY
Discharge Summary    CHIEF COMPLAINT ON ADMISSION  Chief Complaint   Patient presents with   • Drug Overdose     polypharmacy drug OD around 1200 today, found by mother at 1230        Reason for Admission  EMS     Admission Date  2/17/2021    CODE STATUS  Full Code    HPI & HOSPITAL COURSE  This is a 50 y.o. male who presented to [facility] after an attempted to overdose.  History is unclear as the patient was unable to tell us, but he apparently took multiple of his psychiatric medications.  At the outlying facility, he was found to be altered sufficiently that he required intubation for airway protection.    Patient has significant previous medical history that includes ADD, asthma, bipolar disorder, deafness, hypertension, as well as multiple psychiatric diagnoses.    On presentation, patient's lab work included a white count of 15, hemoglobin of 12, normal platelet count.  CMP demonstrated normal renal function, and normal liver enzymes.  There were no significant electrolyte abnormalities.  His acetaminophen was less than 5, blood alcohol less than 10, salicylates less than 1.  Urine tox cream was positive for benzodiazepines otherwise negative.  Patient was given benzodiazepines prior to this test being drawn for his intubation.    Patient was admitted to the ICU on a ventilator, and was successfully extubated on February 18.  Since that time he has been alert and oriented, neurologically intact.  He has been pleasant and cooperative both with us and our psychiatry service.  At present he is being discharged to an inpatient psychiatric service for further psychiatric care.    No notes on file    Therefore, he is discharged in good and stable condition to a psychiatric hospital.    The patient met 2-midnight criteria for an inpatient stay at the time of discharge.    Discharge Date  2/24/2021    FOLLOW UP ITEMS POST DISCHARGE  With the psychiatric team    DISCHARGE DIAGNOSES  Principal Problem:     Acute respiratory failure with hypoxia (HCC) POA: Unknown  Active Problems:    Suicidal behavior with attempted self-injury (HCC) POA: Yes    Schizophrenia (HCC) POA: Yes    Hypotension due to hypovolemia POA: Yes    Drug overdose POA: Yes    Acute renal failure (ARF) (HCC) POA: Yes    Poor dentition POA: Yes    Leukocytosis POA: Yes  Resolved Problems:    * No resolved hospital problems. *      FOLLOW UP  No future appointments.  No follow-up provider specified.    MEDICATIONS ON DISCHARGE     Medication List      CONTINUE taking these medications      Instructions   atorvastatin 20 MG Tabs  Commonly known as: LIPITOR   Take 20 mg by mouth every day.  Dose: 20 mg     escitalopram 10 MG Tabs  Commonly known as: Lexapro   Take 10 mg by mouth every morning.  Dose: 10 mg     hydrOXYzine pamoate 50 MG Caps  Commonly known as: VISTARIL   Take  mg by mouth 4 times a day as needed for Anxiety or Other (Insomnia). 1 to 2 capsules = 50 to 100 mg.  Dose:  mg     ipratropium-albuterol  MCG/ACT Aers  Commonly known as: COMBIVENT RESPIMAT   Inhale 1 Puff 4 times a day as needed.  Dose: 1 Puff     LORazepam 0.5 MG Tabs  Commonly known as: ATIVAN   Take 1 Tab by mouth every four hours as needed for Anxiety.  Dose: 0.5 mg     losartan 100 MG Tabs  Commonly known as: COZAAR   Take 100 mg by mouth every day.  Dose: 100 mg     olanzapine 10 MG tablet  Commonly known as: ZYPREXA   Take 10 mg by mouth every bedtime.  Dose: 10 mg     oxcarbazepine 600 MG tablet  Commonly known as: TRILEPTAL   Take 1,200 mg by mouth 2 times a day. 2 tablets = 1200 mg.  Dose: 1,200 mg     therapeutic multivitamin-minerals Tabs   Take 1 tablet by mouth every day.  Dose: 1 tablet        STOP taking these medications    HYDROcodone-acetaminophen 5-325 MG Tabs per tablet  Commonly known as: NORCO     ibuprofen 600 MG Tabs  Commonly known as: MOTRIN     mirtazapine 15 MG Tabs  Commonly known as: Remeron     mupirocin 2 % Oint  Commonly known  as: BACTROBAN     sulfamethoxazole-trimethoprim 800-160 MG tablet  Commonly known as: Bactrim DS            Allergies  Allergies   Allergen Reactions   • Orange        DIET  Orders Placed This Encounter   Procedures   • Diet Order Diet: Regular; Tray Modifications (optional): No Sharps (Paperware)     Standing Status:   Standing     Number of Occurrences:   1     Order Specific Question:   Diet:     Answer:   Regular [1]     Order Specific Question:   Tray Modifications (optional)     Answer:   No Sharps (Paperware)       ACTIVITY  As tolerated.  Weight bearing as tolerated    CONSULTATIONS  Psychiatry    PROCEDURES  Intubation    LABORATORY  Lab Results   Component Value Date    SODIUM 134 (L) 02/22/2021    POTASSIUM 4.6 02/22/2021    CHLORIDE 101 02/22/2021    CO2 20 02/22/2021    GLUCOSE 108 (H) 02/22/2021    BUN 18 02/22/2021    CREATININE 0.91 02/22/2021    CREATININE 1.0 05/02/2008        Lab Results   Component Value Date    WBC 7.5 02/24/2021    HEMOGLOBIN 14.3 02/24/2021    HEMATOCRIT 42.3 02/24/2021    PLATELETCT 280 02/24/2021        Total time of the discharge process exceeds 30 minutes.

## 2021-02-25 NOTE — DISCHARGE PLANNING
CARIDAD spoke to Evy at [facility], stating she did not get the complete referral.   CARIDAD has re-faxed referral.

## 2021-02-25 NOTE — DISCHARGE PLANNING
Adele from [facility] called to accept Pt.   Dr. Arriaza will be admitting physician.  They request a 1930 Transfer time.     SW will update RN and have them obtain a Discharge Summary.   Once Discharge Summary is complete transportation will be arranged.

## 2021-02-26 VITALS — DIASTOLIC BLOOD PRESSURE: 79 MMHG | SYSTOLIC BLOOD PRESSURE: 130 MMHG

## 2021-02-26 VITALS — DIASTOLIC BLOOD PRESSURE: 85 MMHG | SYSTOLIC BLOOD PRESSURE: 134 MMHG

## 2021-02-26 RX ADMIN — OXCARBAZEPINE SCH MG: 300 TABLET, FILM COATED ORAL at 20:04

## 2021-02-26 RX ADMIN — ESCITALOPRAM OXALATE SCH MG: 10 TABLET, FILM COATED ORAL at 07:51

## 2021-02-26 RX ADMIN — OLANZAPINE SCH MG: 5 TABLET, FILM COATED ORAL at 20:04

## 2021-02-26 RX ADMIN — ATORVASTATIN CALCIUM SCH MG: 20 TABLET, FILM COATED ORAL at 20:04

## 2021-02-26 RX ADMIN — OXCARBAZEPINE SCH MG: 300 TABLET, FILM COATED ORAL at 07:50

## 2021-02-26 RX ADMIN — HYDROXYZINE PAMOATE PRN MG: 50 CAPSULE ORAL at 20:04

## 2021-02-26 RX ADMIN — LOSARTAN POTASSIUM SCH MG: 100 TABLET, FILM COATED ORAL at 07:49

## 2021-02-26 RX ADMIN — IBUPROFEN PRN MG: 200 TABLET, FILM COATED ORAL at 20:04

## 2021-02-27 VITALS — SYSTOLIC BLOOD PRESSURE: 132 MMHG | DIASTOLIC BLOOD PRESSURE: 86 MMHG

## 2021-02-27 VITALS — SYSTOLIC BLOOD PRESSURE: 129 MMHG | DIASTOLIC BLOOD PRESSURE: 87 MMHG

## 2021-02-27 RX ADMIN — LOSARTAN POTASSIUM SCH MG: 100 TABLET, FILM COATED ORAL at 08:27

## 2021-02-27 RX ADMIN — HYDROXYZINE PAMOATE PRN MG: 50 CAPSULE ORAL at 10:44

## 2021-02-27 RX ADMIN — IBUPROFEN PRN MG: 200 TABLET, FILM COATED ORAL at 08:29

## 2021-02-27 RX ADMIN — OXCARBAZEPINE SCH MG: 300 TABLET, FILM COATED ORAL at 08:27

## 2021-02-27 RX ADMIN — OXCARBAZEPINE SCH MG: 300 TABLET, FILM COATED ORAL at 20:05

## 2021-02-27 RX ADMIN — OLANZAPINE SCH MG: 5 TABLET, FILM COATED ORAL at 20:05

## 2021-02-27 RX ADMIN — ESCITALOPRAM OXALATE SCH MG: 10 TABLET, FILM COATED ORAL at 08:28

## 2021-02-27 RX ADMIN — ATORVASTATIN CALCIUM SCH MG: 20 TABLET, FILM COATED ORAL at 20:05

## 2021-02-27 RX ADMIN — IBUPROFEN PRN MG: 200 TABLET, FILM COATED ORAL at 15:59

## 2021-02-28 VITALS — DIASTOLIC BLOOD PRESSURE: 77 MMHG | SYSTOLIC BLOOD PRESSURE: 132 MMHG

## 2021-02-28 VITALS — DIASTOLIC BLOOD PRESSURE: 90 MMHG | SYSTOLIC BLOOD PRESSURE: 131 MMHG

## 2021-02-28 RX ADMIN — LOSARTAN POTASSIUM SCH MG: 100 TABLET, FILM COATED ORAL at 08:24

## 2021-02-28 RX ADMIN — OXCARBAZEPINE SCH MG: 300 TABLET, FILM COATED ORAL at 20:04

## 2021-02-28 RX ADMIN — ACETAMINOPHEN PRN MG: 325 TABLET, FILM COATED ORAL at 20:06

## 2021-02-28 RX ADMIN — ATORVASTATIN CALCIUM SCH MG: 20 TABLET, FILM COATED ORAL at 20:04

## 2021-02-28 RX ADMIN — IBUPROFEN PRN MG: 200 TABLET, FILM COATED ORAL at 15:57

## 2021-02-28 RX ADMIN — ESCITALOPRAM OXALATE SCH MG: 10 TABLET, FILM COATED ORAL at 08:24

## 2021-02-28 RX ADMIN — IBUPROFEN PRN MG: 200 TABLET, FILM COATED ORAL at 08:23

## 2021-02-28 RX ADMIN — OLANZAPINE SCH MG: 5 TABLET, FILM COATED ORAL at 20:05

## 2021-02-28 RX ADMIN — OXCARBAZEPINE SCH MG: 300 TABLET, FILM COATED ORAL at 08:22

## 2021-02-28 RX ADMIN — HYDROXYZINE PAMOATE PRN MG: 50 CAPSULE ORAL at 10:41

## 2021-03-01 VITALS — SYSTOLIC BLOOD PRESSURE: 127 MMHG | DIASTOLIC BLOOD PRESSURE: 82 MMHG

## 2021-03-01 VITALS — SYSTOLIC BLOOD PRESSURE: 153 MMHG | DIASTOLIC BLOOD PRESSURE: 98 MMHG

## 2021-03-01 RX ADMIN — Medication PRN ML: at 12:55

## 2021-03-01 RX ADMIN — ACETAMINOPHEN PRN MG: 325 TABLET, FILM COATED ORAL at 20:01

## 2021-03-01 RX ADMIN — OXCARBAZEPINE SCH MG: 300 TABLET, FILM COATED ORAL at 20:01

## 2021-03-01 RX ADMIN — ACETAMINOPHEN PRN MG: 325 TABLET, FILM COATED ORAL at 12:03

## 2021-03-01 RX ADMIN — OXCARBAZEPINE SCH MG: 300 TABLET, FILM COATED ORAL at 08:39

## 2021-03-01 RX ADMIN — IBUPROFEN PRN MG: 200 TABLET, FILM COATED ORAL at 07:36

## 2021-03-01 RX ADMIN — HYDROXYZINE PAMOATE PRN MG: 50 CAPSULE ORAL at 19:05

## 2021-03-01 RX ADMIN — Medication PRN ML: at 18:20

## 2021-03-01 RX ADMIN — ATORVASTATIN CALCIUM SCH MG: 20 TABLET, FILM COATED ORAL at 20:02

## 2021-03-01 RX ADMIN — ESCITALOPRAM OXALATE SCH MG: 10 TABLET, FILM COATED ORAL at 08:39

## 2021-03-01 RX ADMIN — OLANZAPINE SCH MG: 5 TABLET, FILM COATED ORAL at 20:02

## 2021-03-01 RX ADMIN — LOSARTAN POTASSIUM SCH MG: 100 TABLET, FILM COATED ORAL at 08:39

## 2021-03-02 VITALS — SYSTOLIC BLOOD PRESSURE: 135 MMHG | DIASTOLIC BLOOD PRESSURE: 85 MMHG

## 2021-03-02 VITALS — DIASTOLIC BLOOD PRESSURE: 91 MMHG | SYSTOLIC BLOOD PRESSURE: 127 MMHG

## 2021-03-02 RX ADMIN — Medication PRN ML: at 08:13

## 2021-03-02 RX ADMIN — ESCITALOPRAM OXALATE SCH MG: 10 TABLET, FILM COATED ORAL at 08:12

## 2021-03-02 RX ADMIN — ATORVASTATIN CALCIUM SCH MG: 20 TABLET, FILM COATED ORAL at 20:14

## 2021-03-02 RX ADMIN — OLANZAPINE SCH MG: 5 TABLET, FILM COATED ORAL at 20:14

## 2021-03-02 RX ADMIN — Medication PRN ML: at 15:58

## 2021-03-02 RX ADMIN — ACETAMINOPHEN PRN MG: 325 TABLET, FILM COATED ORAL at 20:14

## 2021-03-02 RX ADMIN — OXCARBAZEPINE SCH MG: 300 TABLET, FILM COATED ORAL at 20:14

## 2021-03-02 RX ADMIN — ACETAMINOPHEN PRN MG: 325 TABLET, FILM COATED ORAL at 15:49

## 2021-03-02 RX ADMIN — OXCARBAZEPINE SCH MG: 300 TABLET, FILM COATED ORAL at 08:12

## 2021-03-02 RX ADMIN — ACETAMINOPHEN PRN MG: 325 TABLET, FILM COATED ORAL at 08:12

## 2021-03-02 RX ADMIN — Medication PRN ML: at 20:14

## 2021-03-02 RX ADMIN — LOSARTAN POTASSIUM SCH MG: 100 TABLET, FILM COATED ORAL at 08:12

## 2021-03-02 RX ADMIN — HYDROXYZINE PAMOATE PRN MG: 50 CAPSULE ORAL at 20:14

## 2021-03-03 VITALS — SYSTOLIC BLOOD PRESSURE: 135 MMHG | DIASTOLIC BLOOD PRESSURE: 92 MMHG

## 2021-03-03 RX ADMIN — ACETAMINOPHEN PRN MG: 325 TABLET, FILM COATED ORAL at 07:23

## 2021-03-03 RX ADMIN — Medication PRN ML: at 09:00

## 2021-03-03 RX ADMIN — LOSARTAN POTASSIUM SCH MG: 100 TABLET, FILM COATED ORAL at 08:53

## 2021-03-03 RX ADMIN — OXCARBAZEPINE SCH MG: 300 TABLET, FILM COATED ORAL at 08:53

## 2021-03-03 RX ADMIN — ESCITALOPRAM OXALATE SCH MG: 10 TABLET, FILM COATED ORAL at 08:53

## 2023-06-20 ENCOUNTER — TELEPHONE (OUTPATIENT)
Dept: CARDIOLOGY | Facility: MEDICAL CENTER | Age: 53
End: 2023-06-20
Payer: MEDICARE

## 2023-06-20 DIAGNOSIS — R07.89 OTHER CHEST PAIN: ICD-10-CM

## 2023-06-20 NOTE — TELEPHONE ENCOUNTER
Message  Received: Today  Indira Edouard, Med Ass't  Aurea Pisano R.N.  Please order EKG for telemed. Thank you  -----------------------------------------------------------------------------------------------    Order placed.

## 2023-06-27 DIAGNOSIS — R07.89 OTHER CHEST PAIN: ICD-10-CM

## 2023-07-11 ENCOUNTER — TELEMEDICINE2 (OUTPATIENT)
Dept: CARDIOLOGY | Facility: MEDICAL CENTER | Age: 53
End: 2023-07-11
Attending: STUDENT IN AN ORGANIZED HEALTH CARE EDUCATION/TRAINING PROGRAM
Payer: MEDICARE

## 2023-07-11 VITALS
DIASTOLIC BLOOD PRESSURE: 80 MMHG | HEART RATE: 90 BPM | OXYGEN SATURATION: 95 % | HEIGHT: 68 IN | RESPIRATION RATE: 16 BRPM | WEIGHT: 150 LBS | SYSTOLIC BLOOD PRESSURE: 106 MMHG | BODY MASS INDEX: 22.73 KG/M2

## 2023-07-11 DIAGNOSIS — R94.31 NONSPECIFIC ABNORMAL ELECTROCARDIOGRAM (ECG) (EKG): ICD-10-CM

## 2023-07-11 DIAGNOSIS — F15.11 METHAMPHETAMINE ABUSE IN REMISSION (HCC): ICD-10-CM

## 2023-07-11 DIAGNOSIS — E78.5 DYSLIPIDEMIA: ICD-10-CM

## 2023-07-11 DIAGNOSIS — R07.89 OTHER CHEST PAIN: ICD-10-CM

## 2023-07-11 DIAGNOSIS — I10 ESSENTIAL HYPERTENSION: ICD-10-CM

## 2023-07-11 DIAGNOSIS — F17.200 CURRENT EVERY DAY SMOKER: ICD-10-CM

## 2023-07-11 PROCEDURE — 99204 OFFICE O/P NEW MOD 45 MIN: CPT | Performed by: STUDENT IN AN ORGANIZED HEALTH CARE EDUCATION/TRAINING PROGRAM

## 2023-07-11 PROCEDURE — 99406 BEHAV CHNG SMOKING 3-10 MIN: CPT | Performed by: STUDENT IN AN ORGANIZED HEALTH CARE EDUCATION/TRAINING PROGRAM

## 2023-07-11 PROCEDURE — 3079F DIAST BP 80-89 MM HG: CPT | Performed by: STUDENT IN AN ORGANIZED HEALTH CARE EDUCATION/TRAINING PROGRAM

## 2023-07-11 PROCEDURE — 3074F SYST BP LT 130 MM HG: CPT | Performed by: STUDENT IN AN ORGANIZED HEALTH CARE EDUCATION/TRAINING PROGRAM

## 2023-07-11 RX ORDER — PALIPERIDONE PALMITATE 234 MG/1.5ML
234 INJECTION INTRAMUSCULAR ONCE
COMMUNITY

## 2023-07-11 RX ORDER — ALBUTEROL SULFATE 90 UG/1
2 AEROSOL, METERED RESPIRATORY (INHALATION) EVERY 6 HOURS PRN
COMMUNITY

## 2023-07-11 RX ORDER — IBUPROFEN 200 MG
200 TABLET ORAL EVERY 6 HOURS PRN
COMMUNITY

## 2023-07-11 RX ORDER — FLUOXETINE HYDROCHLORIDE 20 MG/1
20 CAPSULE ORAL 2 TIMES DAILY
COMMUNITY

## 2023-07-11 RX ORDER — DEXTROAMPHETAMINE SACCHARATE, AMPHETAMINE ASPARTATE MONOHYDRATE, DEXTROAMPHETAMINE SULFATE AND AMPHETAMINE SULFATE 2.5; 2.5; 2.5; 2.5 MG/1; MG/1; MG/1; MG/1
10 CAPSULE, EXTENDED RELEASE ORAL PRN
COMMUNITY

## 2023-07-11 RX ORDER — TRIHEXYPHENIDYL HYDROCHLORIDE 2 MG/1
4 TABLET ORAL 3 TIMES DAILY
COMMUNITY

## 2023-07-11 RX ORDER — ZIPRASIDONE HYDROCHLORIDE 40 MG/1
40 CAPSULE ORAL 2 TIMES DAILY
COMMUNITY

## 2023-07-11 RX ORDER — OMEPRAZOLE 20 MG/1
20 CAPSULE, DELAYED RELEASE ORAL DAILY
COMMUNITY

## 2023-07-11 RX ORDER — GUANFACINE 3 MG/1
TABLET, EXTENDED RELEASE ORAL
COMMUNITY

## 2023-07-11 RX ORDER — PRAZOSIN HYDROCHLORIDE 5 MG/1
15 CAPSULE ORAL NIGHTLY
COMMUNITY

## 2023-07-11 RX ORDER — DIAZEPAM 10 MG/1
10 TABLET ORAL
COMMUNITY

## 2023-07-11 RX ORDER — IBUPROFEN 800 MG/1
TABLET ORAL
COMMUNITY
Start: 2023-06-13

## 2023-07-11 RX ORDER — AMLODIPINE BESYLATE 10 MG/1
10 TABLET ORAL DAILY
COMMUNITY

## 2023-07-11 RX ORDER — CLONIDINE HYDROCHLORIDE 0.1 MG/1
0.1 TABLET ORAL 3 TIMES DAILY PRN
COMMUNITY

## 2023-07-11 ASSESSMENT — ENCOUNTER SYMPTOMS
WHEEZING: 0
NIGHT SWEATS: 0
WEAKNESS: 0
COUGH: 0
SHORTNESS OF BREATH: 0
VOMITING: 0
FEVER: 0
FOCAL WEAKNESS: 0
DIARRHEA: 0
SYNCOPE: 0
DIZZINESS: 0
PALPITATIONS: 0
NAUSEA: 0
PND: 0
ABDOMINAL PAIN: 0
IRREGULAR HEARTBEAT: 0
DYSPNEA ON EXERTION: 0
ORTHOPNEA: 0
NEAR-SYNCOPE: 0

## 2023-07-11 NOTE — PROGRESS NOTES
Cardiology Initial Consultation Note/Telemedicine Note    This evaluation was conducted via [employer] using secure and encrypted videoconferencing technology. The patient was physically located at [facility] in [place], [place]. The patient was presented by a medical professional at the originating site.   The patient's identity was confirmed and verbal consent was obtained for this telemedicine encounter.    Date of note:    7/11/2023    Primary Care Provider: Pcp Pt States None  Referring Provider: [facility]*     Patient Name: Zev Mckinney   YOB: 1970  MRN:              [id number]    Chief Complaint: Chest pain    History of Present Illness: Mr. Zev Mckinney is a 53 y.o. male whose current medical problems include hypertension, prediabetes, and history of meth abuse in remission who is here for cardiac consultation for chest pain.    The patient was seen by his PCP and complained of intermittent chest pain. He was referred to cardiology for further work-up.     The patient presents today to discuss symptoms.  The patient reports that he has been having intermittent chest pinching symptoms.  He is quite active, walks daily about 5 miles, and the chest pinching symptoms are not worsened by walking, and persist.  He denies any associated shortness of breath.  No orthopnea, PND, or leg swelling.  No palpitations.  No syncope or presyncopal episodes.    Cardiovascular Risk Factors:  1. Smoking status: Current everyday smoker  2. Type II Diabetes Mellitus: Pre-diabetes, diet controlled  Lab Results   Component Value Date/Time    HBA1C 6.0 (H) 05/18/2021 10:20 AM     3. Hypertension: On medication  4. Dyslipidemia: On statin  5. Family history of early Coronary Artery Disease in a first degree relative (Male less than 55 years of age; Female less than 65 years of age): None  6.  Obesity and/or Metabolic Syndrome: Body mass index is 22.81 kg/m².  7. Sedentary lifestyle:  Active, walks 5 miles a day    Review of Systems   Constitutional: Negative for fever, malaise/fatigue and night sweats.   Cardiovascular:  Negative for chest pain, dyspnea on exertion, irregular heartbeat, leg swelling, near-syncope, orthopnea, palpitations, paroxysmal nocturnal dyspnea and syncope.   Respiratory:  Negative for cough, shortness of breath and wheezing.    Gastrointestinal:  Negative for abdominal pain, diarrhea, nausea and vomiting.   Neurological:  Negative for dizziness, focal weakness and weakness.       All other systems reviewed and are negative.         Current Outpatient Medications   Medication Sig Dispense Refill    albuterol 108 (90 Base) MCG/ACT Aero Soln inhalation aerosol Inhale 2 Puffs every 6 hours as needed for Shortness of Breath.      amLODIPine (NORVASC) 10 MG Tab Take 10 mg by mouth every day.      omeprazole (PRILOSEC) 20 MG delayed-release capsule Take 20 mg by mouth every day.      ziprasidone (GEODON) 40 MG Cap Take 40 mg by mouth 2 times a day.      cloNIDine (CATAPRES) 0.1 MG Tab Take 0.1 mg by mouth 3 times a day as needed.      paliperidone palmitate ER (INVEGA SUSTENNA) 234 MG/1.5ML Suspension Prefilled Syringe Inject 234 mg into the shoulder, thigh, or buttocks one time. monthly      trihexyphenidyl (ARTANE) 2 MG Tab Take 4 mg by mouth 3 times a day.      GuanFACINE HCl 3 MG TABLET SR 24 HR Take  by mouth.      diazepam (VALIUM) 10 MG tablet Take 10 mg by mouth 1 time a day as needed for Anxiety.      prazosin (MINIPRESS) 5 MG Cap Take 15 mg by mouth every evening.      FLUoxetine (PROZAC) 20 MG Cap Take 20 mg by mouth 2 times a day.      amphetamine-dextroamphetamine XR (ADDERALL XR, 10MG,) 10 MG CAPSULE SR 24 HR Take 10 mg by mouth every morning.      ibuprofen (MOTRIN) 200 MG Tab Take 200 mg by mouth every 6 hours as needed.      atorvastatin (LIPITOR) 20 MG Tab Take 20 mg by mouth every day.      losartan (COZAAR) 100 MG Tab Take 100 mg by mouth every day.       "therapeutic multivitamin-minerals (THERAGRAN-M) Tab Take 1 tablet by mouth every day.      ibuprofen (MOTRIN) 800 MG Tab        No current facility-administered medications for this visit.         Allergies   Allergen Reactions    [place]          Physical Exam:  Ambulatory Vitals  /80 (BP Location: Left arm, Patient Position: Sitting)   Pulse 90   Resp 16   Ht 1.727 m (5' 8\")   Wt 68 kg (150 lb)   SpO2 95%    Oxygen Therapy:  Pulse Oximetry: 95 %  BP Readings from Last 4 Encounters:   07/11/23 106/80   02/24/21 113/73   10/09/17 (!) 171/100   05/27/17 132/93       Weight/BMI: Body mass index is 22.81 kg/m².  Wt Readings from Last 4 Encounters:   07/11/23 68 kg (150 lb)   02/24/21 73 kg (160 lb 15 oz)   10/09/17 67.4 kg (148 lb 9.4 oz)   05/27/17 78.9 kg (174 lb)       General: Well appearing and in no apparent distress  HEENT: Normal  Neurological: No focal sensory deficits  Psychiatric: Appropriate affect, A/O x 3, intact judgement and insight    Lab Data Review:  No results found for: CHOLSTRLTOT, LDL, HDL, TRIGLYCERIDE    Lab Results   Component Value Date/Time    SODIUM 134 (L) 07/02/2021 07:47 PM    SODIUM 134 (L) 02/22/2021 03:15 AM    POTASSIUM 3.9 07/02/2021 07:47 PM    POTASSIUM 4.6 02/22/2021 03:15 AM    CHLORIDE 99 (L) 07/02/2021 07:47 PM    CHLORIDE 101 02/22/2021 03:15 AM    CO2 25 07/02/2021 07:47 PM    CO2 20 02/22/2021 03:15 AM    GLUCOSE 108 (H) 02/22/2021 03:15 AM    BUN 8 07/02/2021 07:47 PM    BUN 18 02/22/2021 03:15 AM    CREATININE 0.90 07/02/2021 07:47 PM    CREATININE 0.91 02/22/2021 03:15 AM    CREATININE 1.0 05/02/2008 06:35 PM     Lab Results   Component Value Date/Time    ALKPHOSPHAT 61 07/02/2021 07:47 PM    ALKPHOSPHAT 116 (H) 02/22/2021 03:15 AM    ASTSGOT 17 07/02/2021 07:47 PM    ASTSGOT 21 02/22/2021 03:15 AM    ALTSGPT 21 07/02/2021 07:47 PM    ALTSGPT 39 02/22/2021 03:15 AM    TBILIRUBIN 0.5 07/02/2021 07:47 PM    TBILIRUBIN 0.2 02/22/2021 03:15 AM      Lab Results "   Component Value Date/Time    WBC 6.1 07/02/2021 07:47 PM    WBC 7.5 02/24/2021 05:37 AM     Lab Results   Component Value Date/Time    HBA1C 6.0 (H) 05/18/2021 10:20 AM         Cardiac Imaging and Procedures Review:    EKG dated 7/10/2023: My personal interpretation is sinus rhythm, voltage in extremity leads    No prior echocardiogram      Assessment & Plan     1. Other chest pain  EC-ECHOCARDIOGRAM COMPLETE W/O CONT    NM-CARDIAC TREADMILL ONLY-NO IMAGING      2. Nonspecific abnormal electrocardiogram (ECG) (EKG)  EC-ECHOCARDIOGRAM COMPLETE W/O CONT    NM-CARDIAC TREADMILL ONLY-NO IMAGING      3. 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 Current every day smoker  NM-CARDIAC TREADMILL ONLY-NO IMAGING      5. Essential hypertension  EC-ECHOCARDIOGRAM COMPLETE W/O CONT      6. Dyslipidemia  NM-CARDIAC TREADMILL ONLY-NO IMAGING            Shared Medical Decision Making:  Chest pain  History of meth abuse in remission  Abnormal EKG  Patient with intermittent atypical chest pain symptoms.  However, he does have risk factors for coronary artery disease such as dyslipidemia, hypertension, and tobacco use  -We will plan for treadmill EKG to assess for any ischemia and BP response to exercise  -We will also plan for echocardiogram given history of meth abuse in remission to assess for LVEF and any structural abnormalities    Dyslipidemia  -Continue atorvastatin 20 mg daily    Essential hypertension  BP well controlled this visit  -Continue losartan 100 mg daily and amlodipine 10 mg daily    Current everyday smoker  Tobacco cessation counseling and education provided for 4 minutes. Nicotine replacement options provided including patch, and further medical treatments including Wellbutrin and Chantix. As well as over the counter options of lozenges and gum.       All of the patient's excellent questions were answered to the best of my knowledge and to his satisfaction.  It was a  pleasure seeing Mr. Zev Mckinney in my clinic today. Return in about 2 months (around 9/11/2023). Patient is aware to call the cardiology clinic with any questions or concerns.      Derrick Walters MD  [facility] Heart and Vascular [facility], [facility]  [address] [address]  Phone: [phone]  Fax: [phone]

## 2023-07-19 ENCOUNTER — APPOINTMENT (OUTPATIENT)
Dept: CARDIOLOGY | Facility: MEDICAL CENTER | Age: 53
End: 2023-07-19
Attending: STUDENT IN AN ORGANIZED HEALTH CARE EDUCATION/TRAINING PROGRAM
Payer: MEDICARE

## 2023-07-21 ENCOUNTER — HOSPITAL ENCOUNTER (OUTPATIENT)
Dept: CARDIOLOGY | Facility: MEDICAL CENTER | Age: 53
End: 2023-07-21
Attending: STUDENT IN AN ORGANIZED HEALTH CARE EDUCATION/TRAINING PROGRAM
Payer: MEDICARE

## 2023-07-21 DIAGNOSIS — R94.31 NONSPECIFIC ABNORMAL ELECTROCARDIOGRAM (ECG) (EKG): ICD-10-CM

## 2023-07-21 DIAGNOSIS — F15.11 METHAMPHETAMINE ABUSE IN REMISSION (HCC): ICD-10-CM

## 2023-07-21 DIAGNOSIS — I10 ESSENTIAL HYPERTENSION: ICD-10-CM

## 2023-07-21 DIAGNOSIS — R07.89 OTHER CHEST PAIN: ICD-10-CM

## 2023-07-21 PROCEDURE — 93306 TTE W/DOPPLER COMPLETE: CPT

## 2023-07-24 LAB
LV EJECT FRACT MOD 2C 99903: 65.08
LV EJECT FRACT MOD 4C 99902: 66.68
LV EJECT FRACT MOD BP 99901: 66.57

## 2023-07-24 PROCEDURE — 93306 TTE W/DOPPLER COMPLETE: CPT | Mod: 26 | Performed by: STUDENT IN AN ORGANIZED HEALTH CARE EDUCATION/TRAINING PROGRAM

## 2023-08-08 ENCOUNTER — HOSPITAL ENCOUNTER (OUTPATIENT)
Dept: RADIOLOGY | Facility: MEDICAL CENTER | Age: 53
End: 2023-08-08
Attending: STUDENT IN AN ORGANIZED HEALTH CARE EDUCATION/TRAINING PROGRAM
Payer: MEDICARE

## 2023-08-08 DIAGNOSIS — E78.5 DYSLIPIDEMIA: ICD-10-CM

## 2023-08-08 DIAGNOSIS — R07.89 OTHER CHEST PAIN: ICD-10-CM

## 2023-08-08 DIAGNOSIS — R94.31 NONSPECIFIC ABNORMAL ELECTROCARDIOGRAM (ECG) (EKG): ICD-10-CM

## 2023-08-08 DIAGNOSIS — F17.200 CURRENT EVERY DAY SMOKER: ICD-10-CM

## 2023-08-08 PROCEDURE — 93017 CV STRESS TEST TRACING ONLY: CPT

## 2023-08-08 PROCEDURE — 93018 CV STRESS TEST I&R ONLY: CPT | Performed by: STUDENT IN AN ORGANIZED HEALTH CARE EDUCATION/TRAINING PROGRAM

## 2023-08-10 ENCOUNTER — TELEPHONE (OUTPATIENT)
Dept: CARDIOLOGY | Facility: MEDICAL CENTER | Age: 53
End: 2023-08-10
Payer: MEDICARE

## 2023-08-10 ENCOUNTER — APPOINTMENT (OUTPATIENT)
Dept: RADIOLOGY | Facility: MEDICAL CENTER | Age: 53
End: 2023-08-10
Attending: STUDENT IN AN ORGANIZED HEALTH CARE EDUCATION/TRAINING PROGRAM
Payer: MEDICARE

## 2023-08-10 NOTE — TELEPHONE ENCOUNTER
HK    Caller: Nimo Mckinney (mother)    Topic/issue: REQUESTING CALL BACK    Nimo would like a call back. She states that someone tried to call her regarding the stress test results for Zev. She would like for an RN to return her call as soon as possible. Please advise.    Thank you,  Miguel BARKSDALE    Callback Number: [phone] (home)

## 2023-11-07 DIAGNOSIS — R07.89 OTHER CHEST PAIN: ICD-10-CM

## 2023-11-07 DIAGNOSIS — R94.31 NONSPECIFIC ABNORMAL ELECTROCARDIOGRAM (ECG) (EKG): ICD-10-CM

## 2023-11-07 DIAGNOSIS — I10 ESSENTIAL HYPERTENSION: ICD-10-CM

## 2023-11-07 NOTE — PROGRESS NOTES
Called patient to get update on his condition. Groin sites stable with mild bruising on the right side. Denies any dizziness/LH, chest pain, shortness of breath. Denies fevers, chills. He will follow-up as scheduled at Dr. Omayra Smith office on Tuesday 5/10. Advised him to call with any further questions. Per routine, EKG order placed for Telemed visit with VR on 11/20/23.

## 2023-11-16 ENCOUNTER — TELEPHONE (OUTPATIENT)
Dept: CARDIOLOGY | Facility: MEDICAL CENTER | Age: 53
End: 2023-11-16
Payer: MEDICARE

## 2023-11-16 DIAGNOSIS — R07.89 OTHER CHEST PAIN: ICD-10-CM

## 2023-11-16 NOTE — TELEPHONE ENCOUNTER
Patient has TELEDMED scheduled on 11/20/2023 with   could we place an order for EKG.    Thank you !

## 2023-11-20 ENCOUNTER — TELEMEDICINE2 (OUTPATIENT)
Dept: CARDIOLOGY | Facility: MEDICAL CENTER | Age: 53
End: 2023-11-20
Attending: INTERNAL MEDICINE
Payer: MEDICARE

## 2023-11-20 VITALS
RESPIRATION RATE: 18 BRPM | WEIGHT: 157 LBS | HEIGHT: 68 IN | SYSTOLIC BLOOD PRESSURE: 128 MMHG | HEART RATE: 73 BPM | DIASTOLIC BLOOD PRESSURE: 86 MMHG | TEMPERATURE: 97.3 F | OXYGEN SATURATION: 98 % | BODY MASS INDEX: 23.79 KG/M2

## 2023-11-20 DIAGNOSIS — R07.89 OTHER CHEST PAIN: ICD-10-CM

## 2023-11-20 PROCEDURE — 3074F SYST BP LT 130 MM HG: CPT | Performed by: INTERNAL MEDICINE

## 2023-11-20 PROCEDURE — 3079F DIAST BP 80-89 MM HG: CPT | Performed by: INTERNAL MEDICINE

## 2023-11-20 PROCEDURE — 99213 OFFICE O/P EST LOW 20 MIN: CPT | Mod: 95 | Performed by: INTERNAL MEDICINE

## 2023-11-20 ASSESSMENT — ENCOUNTER SYMPTOMS
BLURRED VISION: 0
NAUSEA: 0
IRREGULAR HEARTBEAT: 0
CONSTIPATION: 0
PND: 0
HEARTBURN: 0
CLAUDICATION: 0
DEPRESSION: 0
DIARRHEA: 0
ALTERED MENTAL STATUS: 0
WEIGHT GAIN: 0
VOMITING: 0
DYSPNEA ON EXERTION: 0
DIZZINESS: 0
WEIGHT LOSS: 0
ORTHOPNEA: 0
FLANK PAIN: 0
DECREASED APPETITE: 0
COUGH: 0
NEAR-SYNCOPE: 0
SHORTNESS OF BREATH: 0
ABDOMINAL PAIN: 0
FEVER: 0
BACK PAIN: 0
PALPITATIONS: 0
SYNCOPE: 0

## 2023-11-20 NOTE — PROGRESS NOTES
This evaluation was conducted via Zoom using secure and encrypted videoconferencing technology. The [facility]     The patient's identity was confirmed and verbal consent was obtained for this virtual visit.      Cardiology Note    tobacco    History of Present Illness: Zev Mckinney is a 53 y.o. male PMH active tobacco 40 pack years, alcohol sober two years, methamphetamines two years sober who presents for follow up.    Describes still with left sided chest pressure. Describes as knife like. Occurs about daily. Associated with mood he describes. Admits he is depressed and often occurs then. Worsens with massaging. Admits he had left shoulder fracture a few months ago. No other cardiac complaints. Continues to smoke tobacco about a pack daily. Describes sober from drugs and alcohol two years. Compliant with medications he says. Completed cardiac testing see below.     Review of Systems   Constitutional: Negative for decreased appetite, fever, malaise/fatigue, weight gain and weight loss.   HENT:  Negative for congestion and nosebleeds.    Eyes:  Negative for blurred vision.   Cardiovascular:  Negative for chest pain, claudication, dyspnea on exertion, irregular heartbeat, leg swelling, near-syncope, orthopnea, palpitations, paroxysmal nocturnal dyspnea and syncope.   Respiratory:  Negative for cough and shortness of breath.    Endocrine: Negative for cold intolerance and heat intolerance.   Skin:  Negative for rash.   Musculoskeletal:  Negative for back pain.   Gastrointestinal:  Negative for abdominal pain, constipation, diarrhea, heartburn, melena, nausea and vomiting.   Genitourinary:  Negative for dysuria, flank pain and hematuria.   Neurological:  Negative for dizziness.   Psychiatric/Behavioral:  Negative for altered mental status and depression.          Past Medical History:   Diagnosis Date    ADD (attention deficit  disorder)     ASTHMA     Bipolar 2 disorder (HCC)     Deafness     wears 2 hearing aids    Hypertension     Psychiatric disorder          Past Surgical History:   Procedure Laterality Date    OTHER ABDOMINAL SURGERY      gastric ulcer         Current Outpatient Medications   Medication Sig Dispense Refill    albuterol 108 (90 Base) MCG/ACT Aero Soln inhalation aerosol Inhale 2 Puffs every 6 hours as needed for Shortness of Breath.      amLODIPine (NORVASC) 10 MG Tab Take 10 mg by mouth every day.      omeprazole (PRILOSEC) 20 MG delayed-release capsule Take 20 mg by mouth every day.      ziprasidone (GEODON) 40 MG Cap Take 40 mg by mouth 2 times a day.      cloNIDine (CATAPRES) 0.1 MG Tab Take 0.1 mg by mouth 3 times a day as needed.      paliperidone palmitate ER (INVEGA SUSTENNA) 234 MG/1.5ML Suspension Prefilled Syringe Inject 234 mg into the shoulder, thigh, or buttocks one time. monthly      trihexyphenidyl (ARTANE) 2 MG Tab Take 4 mg by mouth 3 times a day.      GuanFACINE HCl 3 MG TABLET SR 24 HR Take  by mouth.      diazepam (VALIUM) 10 MG tablet Take 10 mg by mouth 1 time a day as needed for Anxiety.      prazosin (MINIPRESS) 5 MG Cap Take 15 mg by mouth every evening.      FLUoxetine (PROZAC) 20 MG Cap Take 20 mg by mouth 2 times a day.      amphetamine-dextroamphetamine XR (ADDERALL XR, 10MG,) 10 MG CAPSULE SR 24 HR Take 10 mg by mouth as needed.      atorvastatin (LIPITOR) 20 MG Tab Take 20 mg by mouth every day.      losartan (COZAAR) 100 MG Tab Take 100 mg by mouth every day.      therapeutic multivitamin-minerals (THERAGRAN-M) Tab Take 1 tablet by mouth every day.      ibuprofen (MOTRIN) 200 MG Tab Take 200 mg by mouth every 6 hours as needed. (Patient not taking: Reported on 11/20/2023)      ibuprofen (MOTRIN) 800 MG Tab  (Patient not taking: Reported on 11/20/2023)       No current facility-administered medications for this visit.         Allergies   Allergen Reactions    [place]          History  "reviewed. No pertinent family history.      Social History     Socioeconomic History    Marital status:      Spouse name: Not on file    Number of children: Not on file    Years of education: Not on file    Highest education level: Not on file   Occupational History    Not on file   Tobacco Use    Smoking status: Every Day     Current packs/day: 1.00     Types: Cigarettes    Smokeless tobacco: Current    Tobacco comments:     5 cigs per day   Vaping Use    Vaping Use: Never used   Substance and Sexual Activity    Alcohol use: Not Currently    Drug use: Yes     Types: Marijuana    Sexual activity: Not on file   Other Topics Concern    Not on file   Social History Narrative    Not on file     Social Determinants of Health     Financial Resource Strain: Not on file   Food Insecurity: Not on file   Transportation Needs: Not on file   Physical Activity: Not on file   Stress: Not on file   Social Connections: Not on file   Intimate Partner Violence: Not on file   Housing Stability: Not on file         Physical Exam:  Ambulatory Vitals  /86 (BP Location: Right arm, Patient Position: Sitting, BP Cuff Size: Adult)   Pulse 73   Temp 36.3 °C (97.3 °F)   Resp 18   Ht 1.727 m (5' 8\")   Wt 71.2 kg (157 lb)   SpO2 98%    BP Readings from Last 4 Encounters:   11/20/23 128/86   07/11/23 106/80   02/24/21 113/73   10/09/17 (!) 171/100     Weight/BMI:   Vitals:    11/20/23 1410   BP: 128/86   Weight: 71.2 kg (157 lb)   Height: 1.727 m (5' 8\")    Body mass index is 23.87 kg/m².  Wt Readings from Last 4 Encounters:   11/20/23 71.2 kg (157 lb)   07/11/23 68 kg (150 lb)   02/24/21 73 kg (160 lb 15 oz)   10/09/17 67.4 kg (148 lb 9.4 oz)       Physical Exam  Physical Exam:  Constitutional: Alert, no distress, well-groomed.  Skin: No rashes in visible areas.  Eye: Round. Conjunctiva clear, lids normal. No icterus.   ENMT: Lips pink without lesions, good dentition, moist mucous membranes. Phonation normal.  Neck: No " "masses, no thyromegaly. Moves freely without pain.  CV: Pulse as reported by patient  Respiratory: Unlabored respiratory effort, no cough or audible wheeze  Psych: Alert and oriented x3, normal affect and mood.      Lab Data Review:  No results found for: \"CHOLSTRLTOT\", \"LDL\", \"HDL\", \"TRIGLYCERIDE\"    Lab Results   Component Value Date/Time    SODIUM 134 (L) 07/02/2021 07:47 PM    SODIUM 134 (L) 02/22/2021 03:15 AM    POTASSIUM 3.9 07/02/2021 07:47 PM    POTASSIUM 4.6 02/22/2021 03:15 AM    CHLORIDE 99 (L) 07/02/2021 07:47 PM    CHLORIDE 101 02/22/2021 03:15 AM    CO2 25 07/02/2021 07:47 PM    CO2 20 02/22/2021 03:15 AM    GLUCOSE 108 (H) 02/22/2021 03:15 AM    BUN 8 07/02/2021 07:47 PM    BUN 18 02/22/2021 03:15 AM    CREATININE 0.90 07/02/2021 07:47 PM    CREATININE 0.91 02/22/2021 03:15 AM    CREATININE 1.0 05/02/2008 06:35 PM     CrCl cannot be calculated (Patient's most recent lab result is older than the maximum 7 days allowed.).  Lab Results   Component Value Date/Time    ALKPHOSPHAT 61 07/02/2021 07:47 PM    ALKPHOSPHAT 116 (H) 02/22/2021 03:15 AM    ASTSGOT 17 07/02/2021 07:47 PM    ASTSGOT 21 02/22/2021 03:15 AM    ALTSGPT 21 07/02/2021 07:47 PM    ALTSGPT 39 02/22/2021 03:15 AM    TBILIRUBIN 0.5 07/02/2021 07:47 PM    TBILIRUBIN 0.2 02/22/2021 03:15 AM      Lab Results   Component Value Date/Time    WBC 6.1 07/02/2021 07:47 PM    WBC 7.5 02/24/2021 05:37 AM     Lab Results   Component Value Date/Time    HBA1C 6.0 (H) 05/18/2021 10:20 AM     No components found for: \"TROP\"      Cardiac Imaging and Procedures Review:      TTE 7/2023  CONCLUSIONS  Normal left ventricular systolic function. Normal regional wall motion.   The left ventricular ejection fraction is visually estimated to be 65%.   Grade I diastolic dysfunction.  Normal right ventricular size and systolic function.  Mild tricuspid regurgitation. Estimated right ventricular systolic   pressure is 30 mmHg (normal).  No prior study is available for " comparison.    ETT 8/2023  CONCLUSIONS   Negative treadmill EKG for ischemia.    The patient exercised for 8 minutes, 10 seconds, and 10.3 METs.   Good exercise capacity for age and sex.   Normal blood pressure response to exercise.     Sanders treadmill score +6 (low risk, assuming no prior CAD).     Medical Decision Making:  Problem List Items Addressed This Visit       Other chest pain     Stable cardiac testing and atypical reproducible chest pain very unlikely cardiac. Consider musculoskeletal etiology. Follow up with PCP.     It was my pleasure to meet with Mr. Mckinney.